# Patient Record
Sex: FEMALE | Race: BLACK OR AFRICAN AMERICAN | NOT HISPANIC OR LATINO | Employment: FULL TIME | URBAN - METROPOLITAN AREA
[De-identification: names, ages, dates, MRNs, and addresses within clinical notes are randomized per-mention and may not be internally consistent; named-entity substitution may affect disease eponyms.]

---

## 2017-03-31 ENCOUNTER — PATIENT MESSAGE (OUTPATIENT)
Dept: FAMILY MEDICINE | Facility: CLINIC | Age: 37
End: 2017-03-31

## 2017-04-11 ENCOUNTER — LAB VISIT (OUTPATIENT)
Dept: LAB | Facility: HOSPITAL | Age: 37
End: 2017-04-11
Attending: FAMILY MEDICINE
Payer: COMMERCIAL

## 2017-04-11 ENCOUNTER — OFFICE VISIT (OUTPATIENT)
Dept: FAMILY MEDICINE | Facility: CLINIC | Age: 37
End: 2017-04-11
Payer: COMMERCIAL

## 2017-04-11 VITALS
HEART RATE: 56 BPM | TEMPERATURE: 98 F | BODY MASS INDEX: 23.38 KG/M2 | SYSTOLIC BLOOD PRESSURE: 106 MMHG | WEIGHT: 140.31 LBS | HEIGHT: 65 IN | DIASTOLIC BLOOD PRESSURE: 70 MMHG

## 2017-04-11 DIAGNOSIS — E04.1 THYROID NODULE: ICD-10-CM

## 2017-04-11 DIAGNOSIS — G43.909 MIGRAINE WITHOUT STATUS MIGRAINOSUS, NOT INTRACTABLE, UNSPECIFIED MIGRAINE TYPE: ICD-10-CM

## 2017-04-11 DIAGNOSIS — Z00.00 ROUTINE PHYSICAL EXAMINATION: Primary | ICD-10-CM

## 2017-04-11 LAB
ALBUMIN SERPL BCP-MCNC: 4.2 G/DL
ALP SERPL-CCNC: 42 U/L
ALT SERPL W/O P-5'-P-CCNC: 9 U/L
ANION GAP SERPL CALC-SCNC: 10 MMOL/L
AST SERPL-CCNC: 16 U/L
BASOPHILS # BLD AUTO: 0.03 K/UL
BASOPHILS NFR BLD: 0.5 %
BILIRUB SERPL-MCNC: 0.8 MG/DL
BUN SERPL-MCNC: 10 MG/DL
CALCIUM SERPL-MCNC: 9.4 MG/DL
CHLORIDE SERPL-SCNC: 107 MMOL/L
CHOLEST/HDLC SERPL: 3.5 {RATIO}
CO2 SERPL-SCNC: 22 MMOL/L
CREAT SERPL-MCNC: 0.9 MG/DL
DIFFERENTIAL METHOD: ABNORMAL
EOSINOPHIL # BLD AUTO: 0.5 K/UL
EOSINOPHIL NFR BLD: 6.8 %
ERYTHROCYTE [DISTWIDTH] IN BLOOD BY AUTOMATED COUNT: 12.9 %
EST. GFR  (AFRICAN AMERICAN): >60 ML/MIN/1.73 M^2
EST. GFR  (NON AFRICAN AMERICAN): >60 ML/MIN/1.73 M^2
GLUCOSE SERPL-MCNC: 87 MG/DL
HCT VFR BLD AUTO: 36.6 %
HDL/CHOLESTEROL RATIO: 28.8 %
HDLC SERPL-MCNC: 198 MG/DL
HDLC SERPL-MCNC: 57 MG/DL
HGB BLD-MCNC: 12.6 G/DL
LDLC SERPL CALC-MCNC: 125.2 MG/DL
LYMPHOCYTES # BLD AUTO: 2.4 K/UL
LYMPHOCYTES NFR BLD: 36.9 %
MCH RBC QN AUTO: 30.9 PG
MCHC RBC AUTO-ENTMCNC: 34.4 %
MCV RBC AUTO: 90 FL
MONOCYTES # BLD AUTO: 0.4 K/UL
MONOCYTES NFR BLD: 6.3 %
NEUTROPHILS # BLD AUTO: 3.3 K/UL
NEUTROPHILS NFR BLD: 49.5 %
NONHDLC SERPL-MCNC: 141 MG/DL
PLATELET # BLD AUTO: 277 K/UL
PMV BLD AUTO: 11.1 FL
POTASSIUM SERPL-SCNC: 3.5 MMOL/L
PROT SERPL-MCNC: 7.5 G/DL
RBC # BLD AUTO: 4.08 M/UL
SODIUM SERPL-SCNC: 139 MMOL/L
T4 FREE SERPL-MCNC: 1.2 NG/DL
TRIGL SERPL-MCNC: 79 MG/DL
TSH SERPL DL<=0.005 MIU/L-ACNC: 0.82 UIU/ML
WBC # BLD AUTO: 6.62 K/UL

## 2017-04-11 PROCEDURE — 84439 ASSAY OF FREE THYROXINE: CPT

## 2017-04-11 PROCEDURE — 80053 COMPREHEN METABOLIC PANEL: CPT

## 2017-04-11 PROCEDURE — 85025 COMPLETE CBC W/AUTO DIFF WBC: CPT

## 2017-04-11 PROCEDURE — 80061 LIPID PANEL: CPT

## 2017-04-11 PROCEDURE — 83036 HEMOGLOBIN GLYCOSYLATED A1C: CPT

## 2017-04-11 PROCEDURE — 99999 PR PBB SHADOW E&M-EST. PATIENT-LVL III: CPT | Mod: PBBFAC,,, | Performed by: FAMILY MEDICINE

## 2017-04-11 PROCEDURE — 36415 COLL VENOUS BLD VENIPUNCTURE: CPT | Mod: PO

## 2017-04-11 PROCEDURE — 84443 ASSAY THYROID STIM HORMONE: CPT

## 2017-04-11 PROCEDURE — 99395 PREV VISIT EST AGE 18-39: CPT | Mod: S$GLB,,, | Performed by: FAMILY MEDICINE

## 2017-04-11 RX ORDER — PROPRANOLOL HYDROCHLORIDE 80 MG/1
80 TABLET ORAL DAILY
Qty: 30 TABLET | Refills: 1 | Status: SHIPPED | OUTPATIENT
Start: 2017-04-11 | End: 2017-06-25 | Stop reason: SDUPTHER

## 2017-04-11 RX ORDER — NAPROXEN 500 MG/1
500 TABLET ORAL 2 TIMES DAILY PRN
Qty: 30 TABLET | Refills: 0 | Status: SHIPPED | OUTPATIENT
Start: 2017-04-11 | End: 2017-06-28 | Stop reason: SDUPTHER

## 2017-04-11 NOTE — PROGRESS NOTES
[facility]  Progress Note    SUBJECTIVE:     Chief Complaint   Patient presents with    Headache       HPI   Linda Rivera  is a 36 y.o. female here for annual exam. She also has c/o migraines which are becoming more frequently. They have a 2 year old babygirl who sleep with them in the bed. She admits getting maybe 5-6 hours of interrupted sleep. When the migraine comes, she has photo/phonophobia. imitrex used to work but isn't working as well.    Review of patient's allergies indicates:   Allergen Reactions    No known allergies        Past Medical History:   Diagnosis Date    Migraine headache      Past Surgical History:   Procedure Laterality Date    TUBAL LIGATION  2014    [facility]     Family History   Problem Relation Age of Onset    Hypertension Mother     Cancer Father     Diabetes Sister     Heart disease Sister      Social History   Substance Use Topics    Smoking status: Never Smoker    Smokeless tobacco: Never Used    Alcohol use No      Comment: social        Review of Systems   Constitutional: Negative for chills, diaphoresis, fever and malaise/fatigue.   HENT: Negative for congestion, ear pain, hearing loss and sore throat.    Eyes: Positive for photophobia (and phonophobia). Negative for blurred vision, double vision, discharge and redness.   Respiratory: Negative.  Negative for cough, shortness of breath and wheezing.    Cardiovascular: Negative.  Negative for chest pain and palpitations.   Gastrointestinal: Negative.  Negative for abdominal pain, constipation, diarrhea, nausea and vomiting.   Genitourinary: Negative.  Negative for dysuria and hematuria.   Musculoskeletal: Negative for back pain and myalgias.   Skin: Negative for itching and rash.   Neurological: Positive for headaches. Negative for dizziness, sensory change, focal weakness and weakness.   All other systems reviewed and are negative.    OBJECTIVE:     Vitals:    04/11/17 1548   BP: 106/70   Pulse: (!) 56   Temp:  98.3 °F (36.8 °C)     Body mass index is 23.35 kg/(m^2).    Physical Exam   Constitutional: She is oriented to person, place, and time and well-developed, well-nourished, and in no distress. No distress.   HENT:   Head: Normocephalic and atraumatic.   Right Ear: Tympanic membrane is not perforated, not erythematous and not bulging. No hemotympanum.   Left Ear: Tympanic membrane is not perforated, not erythematous and not bulging. No hemotympanum.   Mouth/Throat: Oropharynx is clear and moist. No oropharyngeal exudate.   Eyes: Conjunctivae and EOM are normal. Pupils are equal, round, and reactive to light.   Neck: No thyromegaly present.   Cardiovascular: Normal rate, regular rhythm and normal heart sounds.  Exam reveals no gallop and no friction rub.    No murmur heard.  Pulmonary/Chest: Effort normal and breath sounds normal. No respiratory distress. She has no wheezes. She has no rales.   Abdominal: Soft. Bowel sounds are normal. She exhibits no distension. There is no tenderness. There is no rebound and no guarding.   Musculoskeletal: Normal range of motion. She exhibits no edema or tenderness.   Lymphadenopathy:     She has no cervical adenopathy.   Neurological: She is alert and oriented to person, place, and time.   CN 2-12 grossly intact   Skin: Skin is warm. No rash noted. She is not diaphoretic. No erythema.       Old records were reviewed. Labs and/or images were independently reviewed.    ASSESSMENT     1. Routine physical examination    2. Migraine without status migrainosus, not intractable, unspecified migraine type    3. Thyroid nodule        PLAN:     Routine physical examination   -     We briefly discussed diet, exercise, and routine preventive exams. All questions and comments addressed.    Migraine without status migrainosus, not intractable, unspecified migraine type  -     naproxen (NAPROSYN) 500 MG tablet; Take 1 tablet (500 mg total) by mouth 2 (two) times daily as needed.  Dispense: 30  tablet; Refill: 0  -     propranolol (INDERAL) 80 MG tablet; Take 1 tablet (80 mg total) by mouth once daily.  Dispense: 30 tablet; Refill: 1  -     CBC auto differential; Future  -     Comprehensive metabolic panel; Future  -     Hemoglobin A1c; Future  -     Lipid panel; Future  -     TSH; Future  -     T4, free; Future  -      Take naproxen prn migraines. Will start propranolol for migraine prophylaxis. Will titrate up as needed.  -      Advised that part of this is her sleep deprivation. She needs to get at least 8 hours a night. Recommend moving baby girl to her own bed.    Thyroid nodule  -     US Soft Tissue Head Neck Thyroid; Future; Expected date: 4/11/17  -      Obtain thyroid u/s to ensure stability.      RTC PRN1 month for migraine management.    Peter Delaney MD  04/11/2017 4:24 PM

## 2017-04-12 LAB
ESTIMATED AVG GLUCOSE: 108 MG/DL
HBA1C MFR BLD HPLC: 5.4 %

## 2017-06-25 DIAGNOSIS — G43.909 MIGRAINE WITHOUT STATUS MIGRAINOSUS, NOT INTRACTABLE, UNSPECIFIED MIGRAINE TYPE: ICD-10-CM

## 2017-06-26 RX ORDER — PROPRANOLOL HYDROCHLORIDE 80 MG/1
TABLET ORAL
Qty: 30 TABLET | Refills: 5 | Status: SHIPPED | OUTPATIENT
Start: 2017-06-26 | End: 2019-06-21

## 2017-06-28 DIAGNOSIS — G43.909 MIGRAINE WITHOUT STATUS MIGRAINOSUS, NOT INTRACTABLE, UNSPECIFIED MIGRAINE TYPE: ICD-10-CM

## 2017-06-28 RX ORDER — NAPROXEN 500 MG/1
TABLET ORAL
Qty: 30 TABLET | Refills: 0 | Status: SHIPPED | OUTPATIENT
Start: 2017-06-28 | End: 2017-08-25 | Stop reason: SDUPTHER

## 2017-08-25 DIAGNOSIS — G43.909 MIGRAINE WITHOUT STATUS MIGRAINOSUS, NOT INTRACTABLE, UNSPECIFIED MIGRAINE TYPE: ICD-10-CM

## 2017-08-25 RX ORDER — NAPROXEN 500 MG/1
TABLET ORAL
Qty: 30 TABLET | Refills: 0 | Status: SHIPPED | OUTPATIENT
Start: 2017-08-25 | End: 2018-05-08

## 2017-09-29 ENCOUNTER — OFFICE VISIT (OUTPATIENT)
Dept: FAMILY MEDICINE | Facility: CLINIC | Age: 37
End: 2017-09-29
Payer: COMMERCIAL

## 2017-09-29 VITALS
TEMPERATURE: 98 F | HEART RATE: 66 BPM | WEIGHT: 148.38 LBS | DIASTOLIC BLOOD PRESSURE: 68 MMHG | HEIGHT: 65 IN | OXYGEN SATURATION: 97 % | BODY MASS INDEX: 24.72 KG/M2 | SYSTOLIC BLOOD PRESSURE: 102 MMHG

## 2017-09-29 DIAGNOSIS — G43.009 MIGRAINE WITHOUT AURA AND WITHOUT STATUS MIGRAINOSUS, NOT INTRACTABLE: ICD-10-CM

## 2017-09-29 PROCEDURE — 3008F BODY MASS INDEX DOCD: CPT | Mod: S$GLB,,, | Performed by: FAMILY MEDICINE

## 2017-09-29 PROCEDURE — 99999 PR PBB SHADOW E&M-EST. PATIENT-LVL III: CPT | Mod: PBBFAC,,, | Performed by: FAMILY MEDICINE

## 2017-09-29 PROCEDURE — 99214 OFFICE O/P EST MOD 30 MIN: CPT | Mod: S$GLB,,, | Performed by: FAMILY MEDICINE

## 2017-09-29 RX ORDER — SUMATRIPTAN SUCCINATE 100 MG/1
TABLET ORAL
Qty: 9 TABLET | Refills: 2 | Status: CANCELLED | OUTPATIENT
Start: 2017-09-29

## 2017-09-29 RX ORDER — RIZATRIPTAN BENZOATE 5 MG/1
5 TABLET ORAL DAILY PRN
Qty: 15 TABLET | Refills: 1 | Status: SHIPPED | OUTPATIENT
Start: 2017-09-29 | End: 2017-12-11 | Stop reason: SDUPTHER

## 2017-09-29 NOTE — PROGRESS NOTES
[facility]  Progress Note    SUBJECTIVE:     Chief Complaint   Patient presents with    Migraine       HPI   Linda Rivera  is a 36 y.o. female here for follow up of her migraines. She says the propranolol is helping decreasing her frequency of the headaches. The naproxen doesn't help at all. When headaches do come, may last 2 days.     Review of patient's allergies indicates:   Allergen Reactions    No known allergies        Past Medical History:   Diagnosis Date    Migraine headache      Past Surgical History:   Procedure Laterality Date    TUBAL LIGATION  2014    [facility]     Family History   Problem Relation Age of Onset    Hypertension Mother     Cancer Father     Diabetes Sister     Heart disease Sister      Social History   Substance Use Topics    Smoking status: Never Smoker    Smokeless tobacco: Never Used    Alcohol use No      Comment: social        Review of Systems   HENT: Negative for hearing loss.    Eyes: Negative for discharge.   Respiratory: Negative for wheezing.    Cardiovascular: Negative for chest pain and palpitations.   Gastrointestinal: Negative for blood in stool, constipation, diarrhea and vomiting.   Genitourinary: Negative for dysuria and hematuria.   Musculoskeletal: Negative for neck pain.   Neurological: Positive for headaches. Negative for weakness.   Endo/Heme/Allergies: Negative for polydipsia.     OBJECTIVE:     Vitals:    09/29/17 1023   BP: 102/68   Pulse: 66   Temp: 98.2 °F (36.8 °C)     Body mass index is 24.69 kg/m².    Physical Exam   Constitutional: She is oriented to person, place, and time and well-developed, well-nourished, and in no distress. No distress.   HENT:   Head: Normocephalic and atraumatic.   Nose: Nose normal.   Eyes: Conjunctivae and EOM are normal.   Cardiovascular: Normal rate, regular rhythm and normal heart sounds.  Exam reveals no gallop and no friction rub.    No murmur heard.  Pulmonary/Chest: Effort normal and breath sounds normal.  No respiratory distress. She has no wheezes. She has no rales. She exhibits no tenderness.   Abdominal: Soft. Bowel sounds are normal. She exhibits no distension. There is no tenderness. There is no rebound.   Neurological: She is alert and oriented to person, place, and time.   No neurological deficits   Skin: Skin is warm. She is not diaphoretic.       Old records were reviewed. Labs and/or images were independently reviewed.    ASSESSMENT     1. Migraine without aura and without status migrainosus, not intractable        PLAN:     Migraine without aura and without status migrainosus, not intractable  -     Start rizatriptan (MAXALT) 5 MG tablet; Take 1 tablet (5 mg total) by mouth daily as needed for Migraine.  Dispense: 15 tablet; Refill: 1  -     Propranolol helping to decrease frequency. Continue current regimen. Will start rizatriptan to help break migraines if they do come, which she say maybe once a month now.    Other orders  -     Cancel: sumatriptan (IMITREX) 100 MG tablet; TAKE 1 TABLET BY MOUTH EVERY DAY AS NEEDED FOR MIGRAINE. MAY TAKE AN ADDITIONAL TABLET 2 HOURS AFTER FIRST TABLET IF NO RELIEF  Dispense: 9 tablet; Refill: 2      RTC GABRIELLA Delaney MD  09/29/2017 11:09 AM

## 2017-12-11 ENCOUNTER — PATIENT MESSAGE (OUTPATIENT)
Dept: FAMILY MEDICINE | Facility: CLINIC | Age: 37
End: 2017-12-11

## 2017-12-11 DIAGNOSIS — G43.009 MIGRAINE WITHOUT AURA AND WITHOUT STATUS MIGRAINOSUS, NOT INTRACTABLE: ICD-10-CM

## 2017-12-11 RX ORDER — RIZATRIPTAN BENZOATE 5 MG/1
5 TABLET ORAL DAILY PRN
Qty: 15 TABLET | Refills: 0 | Status: SHIPPED | OUTPATIENT
Start: 2017-12-11 | End: 2018-07-06 | Stop reason: SDUPTHER

## 2017-12-28 ENCOUNTER — HOSPITAL ENCOUNTER (EMERGENCY)
Facility: OTHER | Age: 37
Discharge: HOME | End: 2017-12-28
Attending: EMERGENCY MEDICINE
Payer: COMMERCIAL

## 2017-12-28 VITALS
DIASTOLIC BLOOD PRESSURE: 76 MMHG | BODY MASS INDEX: 26.66 KG/M2 | TEMPERATURE: 100 F | OXYGEN SATURATION: 100 % | RESPIRATION RATE: 18 BRPM | SYSTOLIC BLOOD PRESSURE: 122 MMHG | WEIGHT: 160 LBS | HEART RATE: 86 BPM | HEIGHT: 65 IN

## 2017-12-28 DIAGNOSIS — J10.1 INFLUENZA A: Primary | ICD-10-CM

## 2017-12-28 LAB
CTP QC/QA: YES
FLUAV AG NPH QL: POSITIVE
FLUBV AG NPH QL: NEGATIVE

## 2017-12-28 PROCEDURE — 25000003 PHARM REV CODE 250: Performed by: EMERGENCY MEDICINE

## 2017-12-28 PROCEDURE — 99284 EMERGENCY DEPT VISIT MOD MDM: CPT

## 2017-12-28 PROCEDURE — 87804 INFLUENZA ASSAY W/OPTIC: CPT

## 2017-12-28 RX ORDER — IBUPROFEN 800 MG/1
800 TABLET ORAL EVERY 6 HOURS PRN
Qty: 20 TABLET | Refills: 0 | Status: SHIPPED | OUTPATIENT
Start: 2017-12-28 | End: 2018-05-08

## 2017-12-28 RX ORDER — IBUPROFEN 400 MG/1
800 TABLET ORAL
Status: COMPLETED | OUTPATIENT
Start: 2017-12-28 | End: 2017-12-28

## 2017-12-28 RX ORDER — CODEINE PHOSPHATE AND GUAIFENESIN 10; 100 MG/5ML; MG/5ML
10 SOLUTION ORAL EVERY 4 HOURS PRN
Qty: 236 ML | Refills: 0 | Status: SHIPPED | OUTPATIENT
Start: 2017-12-28 | End: 2018-01-07

## 2017-12-28 RX ORDER — OSELTAMIVIR PHOSPHATE 75 MG/1
75 CAPSULE ORAL 2 TIMES DAILY
Qty: 10 CAPSULE | Refills: 0 | Status: SHIPPED | OUTPATIENT
Start: 2017-12-28 | End: 2018-01-02

## 2017-12-28 RX ADMIN — IBUPROFEN 800 MG: 400 TABLET, FILM COATED ORAL at 03:12

## 2017-12-28 NOTE — ED PROVIDER NOTES
"Encounter Date: 12/28/2017       History     Chief Complaint   Patient presents with    Influenza     Pt states, " Body aches chills and cough for three days."     Chief complaint: Headache  37-year-old complains of pressure over her face.  Patient symptoms began 2 days ago.  She has a history of migraines and says this feels different.  She denies fever but has had chills.  She had a sore throat 2 days ago that has resolved.  She does report nasal congestion.  She's had a decreased appetite.  She reports generalized body aches.  Patient has been taken over-the-counter medicines with some improvement.      The history is provided by the patient.     Review of patient's allergies indicates:   Allergen Reactions    No known allergies      Past Medical History:   Diagnosis Date    Migraine headache      Past Surgical History:   Procedure Laterality Date    TUBAL LIGATION  2014    [facility]     Family History   Problem Relation Age of Onset    Hypertension Mother     Cancer Father     Diabetes Sister     Heart disease Sister      Social History   Substance Use Topics    Smoking status: Never Smoker    Smokeless tobacco: Never Used    Alcohol use No      Comment: social     Review of Systems   Constitutional: Positive for activity change, appetite change and chills. Negative for fever.   HENT: Positive for congestion. Negative for sore throat.    Eyes: Negative for photophobia.   Respiratory: Positive for cough. Negative for shortness of breath.    Cardiovascular: Negative for chest pain.   Gastrointestinal: Positive for nausea.   Genitourinary: Negative for dysuria.   Musculoskeletal: Positive for myalgias. Negative for back pain.   Skin: Negative for rash.   Neurological: Positive for headaches. Negative for weakness.   Hematological: Does not bruise/bleed easily.       Physical Exam     Initial Vitals [12/28/17 1401]   BP Pulse Resp Temp SpO2   121/82 85 16 (!) 100.9 °F (38.3 °C) 99 %      MAP       95     "     Physical Exam    Nursing note and vitals reviewed.  Constitutional: She appears well-developed and well-nourished.   HENT:   Head: Normocephalic and atraumatic.   Eyes: Conjunctivae and EOM are normal. Pupils are equal, round, and reactive to light.   Neck: Trachea normal, normal range of motion and full passive range of motion without pain. Neck supple. No neck rigidity.   Cardiovascular: Normal rate and regular rhythm.   Pulmonary/Chest: Breath sounds normal.   Abdominal: Soft. There is no tenderness. There is no rebound and no guarding.   Musculoskeletal: Normal range of motion.   Neurological: She is alert and oriented to person, place, and time. She has normal strength.   Skin: Skin is warm and dry.   Psychiatric: She has a normal mood and affect.         ED Course   Procedures  Labs Reviewed   POCT INFLUENZA A/B - Abnormal; Notable for the following:        Result Value    Rapid Influenza A Ag Positive (*)     All other components within normal limits             Medical Decision Making:   Initial Assessment:   37-year-old who complains of headache associated with flulike symptoms.  Patient symptoms have been ongoing for 2 days.  On exam patient is nontoxic appearing.  She has a fever to 100.9  She has no nuchal rigidity  ED Management:  Condition did test positive for influenza A.  She was given ibuprofen in the ER for a temperature of 100.9.  She'll be discharged on Tamiflu and guaifenesin with codeine.                   ED Course      Clinical Impression:   The encounter diagnosis was Influenza A.                           Lori Carvajal MD  12/28/17 2568

## 2018-05-08 ENCOUNTER — OFFICE VISIT (OUTPATIENT)
Dept: OBSTETRICS AND GYNECOLOGY | Facility: CLINIC | Age: 38
End: 2018-05-08
Payer: COMMERCIAL

## 2018-05-08 VITALS
BODY MASS INDEX: 26.45 KG/M2 | WEIGHT: 158.75 LBS | HEIGHT: 65 IN | DIASTOLIC BLOOD PRESSURE: 76 MMHG | SYSTOLIC BLOOD PRESSURE: 108 MMHG

## 2018-05-08 DIAGNOSIS — Z01.419 ENCOUNTER FOR GYNECOLOGICAL EXAMINATION WITHOUT ABNORMAL FINDING: Primary | ICD-10-CM

## 2018-05-08 PROCEDURE — 99999 PR PBB SHADOW E&M-EST. PATIENT-LVL II: CPT | Mod: PBBFAC,,, | Performed by: OBSTETRICS & GYNECOLOGY

## 2018-05-08 PROCEDURE — 99395 PREV VISIT EST AGE 18-39: CPT | Mod: S$GLB,,, | Performed by: OBSTETRICS & GYNECOLOGY

## 2018-05-08 PROCEDURE — 88175 CYTOPATH C/V AUTO FLUID REDO: CPT

## 2018-05-08 NOTE — PROGRESS NOTES
PT HERE FOR ANNUAL.    ROS:  GENERAL: No fever, chills, fatigability or weight loss.  VULVAR: No pain, no lesions and no itching.  VAGINAL: No relaxation, no itching, no discharge, no abnormal bleeding and no lesions.  ABDOMEN: No abdominal pain. Denies nausea. Denies vomiting. No diarrhea. No constipation  BREAST: Denies pain. No lumps. No discharge.  URINARY: No incontinence, no nocturia, no frequency and no dysuria.  CARDIOVASCULAR: No chest pain. No shortness of breath. No leg cramps.  NEUROLOGICAL: No headaches. No vision changes.  The remainder of the review of systems was negative.    PE:  General Appearance: overweight And Well developed. Well nourished. In no acute distress.  Vulva: Lesions: No.  Urethral Meatus: Normal size. Normal location. No lesions. No prolapse.  Urethra: No masses. No tenderness. No prolapse. No scarring.  Bladder: No masses. No tenderness.  Vagina: Mucosa NI:yes discharge no, atrophy no, cystocele no or rectocele no.  Cervix: Lesion: no  Stenotic: no Cervical motion tenderness: no  Uterus: Uterus size: 6 weeks. Support good. Uterus size: Normal  Adnexa: Masses: No Tenderness: No CDS Nodularity: No  Abdomen: overweight No masses. No tenderness.  Breasts: No bilateral masses. No bilateral discharge. No bilateral tenderness. No bilateral fibrocystic changes.  Neck: No thyroid enlargement. No thyroid masses.  Skin: Rashes: No      PROCEDURES:    PLAN:     DIAGNOSIS:  1. Encounter for gynecological examination without abnormal finding        MEDICATIONS & ORDERS:  Orders Placed This Encounter    Liquid-based pap smear, screening       Patient was counseled today on the new ACS guidelines for cervical cytology screening as well as the current recommendations for breast cancer screening. She was counseled to follow up with her PCP for other routine health maintenance. Counseling session lasted approximately 10 minutes, and all her questions were answered.         FOLLOW-UP: With me in 12  month

## 2018-07-06 DIAGNOSIS — G43.009 MIGRAINE WITHOUT AURA AND WITHOUT STATUS MIGRAINOSUS, NOT INTRACTABLE: ICD-10-CM

## 2018-07-06 RX ORDER — RIZATRIPTAN BENZOATE 5 MG/1
TABLET ORAL
Qty: 15 TABLET | Refills: 0 | Status: SHIPPED | OUTPATIENT
Start: 2018-07-06 | End: 2018-09-20 | Stop reason: SDUPTHER

## 2018-09-20 DIAGNOSIS — G43.009 MIGRAINE WITHOUT AURA AND WITHOUT STATUS MIGRAINOSUS, NOT INTRACTABLE: ICD-10-CM

## 2018-09-20 RX ORDER — RIZATRIPTAN BENZOATE 5 MG/1
TABLET ORAL
Qty: 15 TABLET | Refills: 0 | Status: SHIPPED | OUTPATIENT
Start: 2018-09-20 | End: 2019-11-11

## 2019-02-19 ENCOUNTER — OFFICE VISIT (OUTPATIENT)
Dept: PODIATRY | Facility: CLINIC | Age: 39
End: 2019-02-19
Payer: COMMERCIAL

## 2019-02-19 VITALS — WEIGHT: 158 LBS | BODY MASS INDEX: 26.33 KG/M2 | HEIGHT: 65 IN

## 2019-02-19 DIAGNOSIS — M21.41 PES PLANUS OF BOTH FEET: ICD-10-CM

## 2019-02-19 DIAGNOSIS — M79.671 FOOT PAIN, RIGHT: Primary | ICD-10-CM

## 2019-02-19 DIAGNOSIS — M21.42 PES PLANUS OF BOTH FEET: ICD-10-CM

## 2019-02-19 DIAGNOSIS — M20.41 HAMMER TOES OF BOTH FEET: ICD-10-CM

## 2019-02-19 DIAGNOSIS — M20.5X1 HALLUX LIMITUS, ACQUIRED, RIGHT: ICD-10-CM

## 2019-02-19 DIAGNOSIS — M20.5X2 HALLUX LIMITUS, ACQUIRED, LEFT: ICD-10-CM

## 2019-02-19 DIAGNOSIS — M20.42 HAMMER TOES OF BOTH FEET: ICD-10-CM

## 2019-02-19 PROCEDURE — 99999 PR PBB SHADOW E&M-EST. PATIENT-LVL III: ICD-10-PCS | Mod: PBBFAC,,, | Performed by: PODIATRIST

## 2019-02-19 PROCEDURE — 3008F BODY MASS INDEX DOCD: CPT | Mod: CPTII,S$GLB,, | Performed by: PODIATRIST

## 2019-02-19 PROCEDURE — 99999 PR PBB SHADOW E&M-EST. PATIENT-LVL III: CPT | Mod: PBBFAC,,, | Performed by: PODIATRIST

## 2019-02-19 PROCEDURE — 3008F PR BODY MASS INDEX (BMI) DOCUMENTED: ICD-10-PCS | Mod: CPTII,S$GLB,, | Performed by: PODIATRIST

## 2019-02-19 PROCEDURE — 99203 OFFICE O/P NEW LOW 30 MIN: CPT | Mod: S$GLB,,, | Performed by: PODIATRIST

## 2019-02-19 PROCEDURE — 99203 PR OFFICE/OUTPT VISIT, NEW, LEVL III, 30-44 MIN: ICD-10-PCS | Mod: S$GLB,,, | Performed by: PODIATRIST

## 2019-02-19 RX ORDER — MELOXICAM 15 MG/1
15 TABLET ORAL DAILY
Qty: 30 TABLET | Refills: 1 | Status: SHIPPED | OUTPATIENT
Start: 2019-02-19 | End: 2019-06-21

## 2019-02-20 NOTE — PROGRESS NOTES
Subjective:      Patient ID: Linda Rivera is a 38 y.o. female.    Chief Complaint: Foot Problem (right foot bunion pain Pcp Dr. Delaney ); Foot Pain; and Bunions    Linda RAMSEY is a 38 y.o. female who presents to the podiatry clinic  with complaint of  right 1st MTPJ pain. Onset of the symptoms was several months ago. Precipitating event: none known. Current symptoms include: ability to bear weight, but with some pain, stiffness and swelling. Aggravating factors: standing and walking. Symptoms have waxed and waned but are worse overall. Patient has had no prior foot problems. Evaluation to date: none. Treatment to date: none. Patients rates pain 9/10 on pain scale.    Current shoe gear:   casual shoes, she admits to wearing flip flops    Patient Active Problem List   Diagnosis    Allergic rhinitis       Current Outpatient Medications on File Prior to Visit   Medication Sig Dispense Refill    propranolol (INDERAL) 80 MG tablet TAKE 1 TABLET(80 MG) BY MOUTH EVERY DAY 30 tablet 5    rizatriptan (MAXALT) 5 MG tablet TAKE 1 TABLET(5 MG) BY MOUTH DAILY AS NEEDED FOR MIGRAINE 15 tablet 0    loratadine (CLARITIN) 10 mg tablet Take 1 tablet (10 mg total) by mouth daily as needed. 30 tablet 0     No current facility-administered medications on file prior to visit.        Review of patient's allergies indicates:   Allergen Reactions    No known allergies        Past Surgical History:   Procedure Laterality Date    LIGATION, FALLOPIAN TUBE, LAPAROSCOPIC Bilateral 5/6/2014    Performed by Oliverio Bazan Jr., MD at [facility]    TUBAL LIGATION  2014    [facility]       Family History   Problem Relation Age of Onset    Hypertension Mother     Cancer Father     Diabetes Sister     Heart disease Sister        Social History     Socioeconomic History    Marital status:      Spouse name: Not on file    Number of children: Not on file    Years of education: Not on file    Highest education level: Not on file   Social Needs  "   Financial resource strain: Not on file    Food insecurity - worry: Not on file    Food insecurity - inability: Not on file    Transportation needs - medical: Not on file    Transportation needs - non-medical: Not on file   Occupational History    Not on file   Tobacco Use    Smoking status: Never Smoker    Smokeless tobacco: Never Used   Substance and Sexual Activity    Alcohol use: No     Alcohol/week: 0.0 oz     Comment: social    Drug use: No    Sexual activity: Yes     Partners: Male     Birth control/protection: None   Other Topics Concern    Not on file   Social History Narrative    Not on file     Review of Systems   Constitution: Negative for chills, fever and weakness.   Cardiovascular: Negative for claudication and leg swelling.   Respiratory: Negative for cough and shortness of breath.    Skin: Negative for dry skin, itching, nail changes and rash.   Musculoskeletal: Positive for joint pain and myalgias. Negative for falls, joint swelling and muscle weakness.   Gastrointestinal: Negative for diarrhea, nausea and vomiting.   Neurological: Positive for paresthesias. Negative for numbness and tremors.   Psychiatric/Behavioral: Negative for altered mental status and hallucinations.           Objective:      Vitals:    02/19/19 1826   Weight: 71.7 kg (158 lb)   Height: 5' 5" (1.651 m)   PainSc:   9   PainLoc: Foot       Physical Exam   Constitutional:  Non-toxic appearance. She does not have a sickly appearance. No distress.   Cardiovascular:   Pulses:       Dorsalis pedis pulses are 2+ on the right side, and 2+ on the left side.        Posterior tibial pulses are 2+ on the right side, and 2+ on the left side.   Pulmonary/Chest: No respiratory distress.   Musculoskeletal:        Right ankle: She exhibits decreased range of motion. She exhibits no swelling. No tenderness. No lateral malleolus, no medial malleolus, no AITFL, no CF ligament and no posterior TFL tenderness found. Achilles tendon " exhibits no pain, no defect and normal Bunn's test results.        Left ankle: She exhibits decreased range of motion. She exhibits no swelling. No tenderness. No lateral malleolus, no medial malleolus, no AITFL, no CF ligament and no posterior TFL tenderness found. Achilles tendon exhibits no pain, no defect and normal Bunn's test results.        Right foot: There is tenderness (dorsal 1st MTPJ and with end ROM) and swelling. There is no bony tenderness.        Left foot: There is no bony tenderness.   There is equinus deformity bilateral with decreased dorsiflexion at the ankle joint bilateral.  Gait analysis reveals excessive pronation through midstance and propulsion with early heel off. Shoes reveals lateral heel counter wear bilateral     Decreased first MPJ range of motion both weightbearing and nonweightbearing, no crepitus observed the first MP joint, + dorsal flag sign. Mild  bunion deformity is observed .    Patient has hammertoes of digits 2-5 bilateral partially reducible      Neurological: She has normal reflexes. She displays no atrophy and no tremor. No sensory deficit. She exhibits normal muscle tone.   Skin: Skin is warm, dry and intact. No bruising, no burn, no laceration, no lesion and no rash noted. She is not diaphoretic. No cyanosis. No pallor. Nails show no clubbing.   Psychiatric: Her mood appears not anxious. Her affect is not inappropriate. Her speech is not slurred. She is not combative. She is communicative. She is attentive.   Nursing note reviewed.        Assessment:       Encounter Diagnoses   Name Primary?    Foot pain, right Yes    Hallux limitus, acquired, right     Hallux limitus, acquired, left     Pes planus of both feet     Hammer toes of both feet          Plan:     Problem List Items Addressed This Visit     None      Visit Diagnoses     Foot pain, right    -  Primary    Relevant Orders    X-Ray Foot Complete Right    Hallux limitus, acquired, right         Relevant Orders    X-Ray Foot Complete Right    Hallux limitus, acquired, left        Pes planus of both feet        Relevant Orders    X-Ray Foot Complete Right    Hammer toes of both feet        Relevant Orders    X-Ray Foot Complete Right           Orders Placed This Encounter    X-Ray Foot Complete Right    meloxicam (MOBIC) 15 MG tablet       I counseled the patient on her conditions, their implications and medical management.      I did  the patient in detail regarding surgical and conservative treatment measures for hallux limitus. I informed the  patient that the majority of pain is secondary to an arthritic joint with decreased joint spaces. Informed patient that outside of surgical intervention the main goal of therapy is to decreased the  range of motion at the first MPJ joint. This can be done so by utilizing either and extremely hard soled nonflexible shoe or forefoot rocker    I gave written and verbal instructions on stretching exercises. Patient expressed understanding. Discussed icing the affected area as needed and also wearing appropriate shoe gear and avoiding flats, slippers, sandals, and going barefoot. My recommendation for OTC supports is [employer]. Patient instructed on adequate icing techniques. Patient should ice the affected area at least once per day x 10 minutes for 10 days I advised the patient that extra icing would also be beneficial to ensure adequate anti inflammatory effect. We also discussed cortisone injections and NSAID therapy. Mobic prescribed. Patient was instructed on dosing information. Discontinue if adverse effects occur     RTC in 6-9 weeks if no improvement, at this time patient will receive cortisone injections and referral to PT. Patient is amenable to plan.

## 2019-02-20 NOTE — PATIENT INSTRUCTIONS
Recommend lotions: eucerin, eucerin for diabetics, aquaphor, A&D ointment, gold bond for diabetics, sween, Maljamar's Bees all purpose baby ointment,  urea 40 with aloe (found on amazon.com)    Shoe recommendations: (try 6pm.com, zappos.com , nordstromrack[employer], or shoes[employer] for discounted prices) you can visit [employer] shoes in [place]  or [employer] in the [facility] (there are also several shoe brand outlets in the [facility])    Asics (GT 2000 or gel foundations), new balance stability type shoes (such as the 940 series), saucony (stabil c3),  Noe (GTS or Beast or transcend), propet (tennis shoe)    Sofft Brand (women) Stefania&Rajesh (men), clarks, crocs, aerosoles, naturalizers, SAS, ecco, born, cachorro chris, rockports (dress shoes)    Vionic, burkenstocks, fitflops, propet (sandals)    Nike comfort thong sandals, crocs, propet (house shoes)    Nail Home remedy:  Vicks Vapor rub to nails for easier manageability        What Is Arthritis in the Foot?  Degenerative arthritis is a condition that slowly wears away joints, the area where bones meet and move. In the beginning, you may notice that the affected joint seems stiff. It may even ache. As the joint lining (cartilage) breaks down, the bones rub against each other, causing pain and swelling. Over time, small pieces of rough or splintered bone (bone spurs) develop, and the joints range of motion becomes limited. But movement doesnt have to cause pain. The effects of arthritis can be reduced.    The big-toe joint  When arthritis affects your big toe, your foot hurts when it pushes off the ground. Arthritis often appears in the big-toe joint along with a bunion (a bony bump at the side of the joint) or a bone spur on top of the joint.    Other joints  When arthritis affects the rear or midfoot joints, you feel pain when you put weight on your foot. Arthritis may affect the joint where the ankle and foot meet. It may also affect other joints nearby.  Date Last  Reviewed: 7/1/2016  © 6786-8349 [employer] [address] [place] [address], [place] [address]. All rights reserved. This information is not intended as a substitute for professional medical care. Always follow your healthcare professional's instructions.        Treating Arthritis in the Foot  If your symptoms are mild, medications may be enough to reduce pain and swelling. For more severe arthritis, surgery may be needed to improve the condition of the joint.    Medicine  Your doctor may prescribe medicine--pills or injections--to limit pain and swelling. Ice, aspirin, acetaminophen, or ibuprofen may help relieve mild symptoms that occur after activity.  Surgery and bone trimming  To ease movement and reduce pain, your doctor may trim damaged bone. If arthritis is severe, the joint may be fused or removed. If the bone is not damaged too badly, your doctor may simply shave away bone spurs. Any excess bone growth related to a bunion may also be trimmed.  Fusing joints  If damage is more severe, your doctor may fuse the joint to prevent the bones from rubbing. Afterward, staples, plates, or screws may hold the bones in place so they heal properly. In some cases, the joint may be removed and replaced with an implant.  After surgery  During the early stages of recovery, your foot is likely to be bandaged and immobilized for a while. For best results, follow up with your doctor as scheduled. These visits help ensure that your foot heals properly.  As you heal  After surgery, youll be told how to care for your incision and how soon to begin walking on the foot. Until the foot can bear weight, you may need to walk with crutches or a cane.  For surgery on the big toe, your foot may be splinted to limit movement for several weeks. Despite this, you should be able to walk soon after surgery.  For surgery on rear or midfoot joints, you may need to wear a cast or surgical shoe. These joints are fairly large, so full  recovery may take a few months. Once the bone has healed, any staples, plates, or screws may be removed.  Date Last Reviewed: 7/1/2016 © 2000-2017 [employer]. [address] [place] [address], [place] [address]. All rights reserved. This information is not intended as a substitute for professional medical care. Always follow your healthcare professional's instructions.        Foot Surgery: Degenerative Joint Disease    Degenerative joint disease (arthritis) often happens in the joint of a big toe. This bone growth may cause pain and stiffness in the joint. Left untreated, arthritis can break down the cartilage and destroy the joint. Your treatment choices depend on how damaged your joint is. There are many nonsurgical treatments, but if these are not helpful, surgery may be considered.    Cheilectomy  This is done when the arthritic joint and cartilage can be saved. A bone spur caused by arthritis may be symptomatic on the top of the big toe joint. The procedure involves removing this bone spur, usually with a small part of the top of the joint itself.  You will need to wear a surgical shoe for several weeks. Once the foot heals, joint movement is restored.    Fusion  In fusion, the cartilage and some bone on both sides of the joint are removed. Then, the big toe and metatarsal bones are held together with staples, screws, or a plate and screws. Your foot may be placed in a cast. While you heal, you will be asked not to bear weight on this foot. You may also need crutches for several weeks. Because the joint has been removed, your toe will be less flexible.    Arthroplasty  During surgery, bone growth caused by the arthritis is trimmed, and part of the joint is removed. A pin can be used to align the bones and to keep them from touching. The pin is removed after several weeks. In some cases, the entire joint may be replaced with an implant. You may have to wear a splint or a surgical shoe for several weeks.  When healed, the bones become connected with scar tissue.  Date Last Reviewed: 10/15/2015  © 0628-4109 [employer], [employer]. [address] [place] [address], [place] [address]. All rights reserved. This information is not intended as a substitute for professional medical care. Always follow your healthcare professional's instructions.

## 2019-06-21 ENCOUNTER — LAB VISIT (OUTPATIENT)
Dept: LAB | Facility: HOSPITAL | Age: 39
End: 2019-06-21
Attending: INTERNAL MEDICINE
Payer: COMMERCIAL

## 2019-06-21 ENCOUNTER — OFFICE VISIT (OUTPATIENT)
Dept: ENDOCRINOLOGY | Facility: CLINIC | Age: 39
End: 2019-06-21
Payer: COMMERCIAL

## 2019-06-21 VITALS
DIASTOLIC BLOOD PRESSURE: 82 MMHG | BODY MASS INDEX: 24.91 KG/M2 | HEIGHT: 65 IN | HEART RATE: 60 BPM | RESPIRATION RATE: 16 BRPM | SYSTOLIC BLOOD PRESSURE: 116 MMHG | WEIGHT: 149.5 LBS | TEMPERATURE: 98 F

## 2019-06-21 DIAGNOSIS — E55.9 HYPOVITAMINOSIS D: Primary | ICD-10-CM

## 2019-06-21 DIAGNOSIS — E04.1 NODULAR THYROID DISEASE: ICD-10-CM

## 2019-06-21 DIAGNOSIS — E06.9 THYROIDITIS: ICD-10-CM

## 2019-06-21 DIAGNOSIS — E07.9 THYROID DISEASE: ICD-10-CM

## 2019-06-21 DIAGNOSIS — E04.9 GOITER: ICD-10-CM

## 2019-06-21 DIAGNOSIS — E55.9 HYPOVITAMINOSIS D: ICD-10-CM

## 2019-06-21 DIAGNOSIS — R79.89 ABNORMAL THYROID BLOOD TEST: ICD-10-CM

## 2019-06-21 LAB
BASOPHILS # BLD AUTO: 0.03 K/UL (ref 0–0.2)
BASOPHILS NFR BLD: 0.5 % (ref 0–1.9)
DIFFERENTIAL METHOD: ABNORMAL
EOSINOPHIL # BLD AUTO: 0.5 K/UL (ref 0–0.5)
EOSINOPHIL NFR BLD: 8.4 % (ref 0–8)
ERYTHROCYTE [DISTWIDTH] IN BLOOD BY AUTOMATED COUNT: 13.5 % (ref 11.5–14.5)
HCT VFR BLD AUTO: 36.2 % (ref 37–48.5)
HGB BLD-MCNC: 11.5 G/DL (ref 12–16)
IMM GRANULOCYTES # BLD AUTO: 0 K/UL (ref 0–0.04)
IMM GRANULOCYTES NFR BLD AUTO: 0 % (ref 0–0.5)
LYMPHOCYTES # BLD AUTO: 1.8 K/UL (ref 1–4.8)
LYMPHOCYTES NFR BLD: 32.9 % (ref 18–48)
MCH RBC QN AUTO: 30.2 PG (ref 27–31)
MCHC RBC AUTO-ENTMCNC: 31.8 G/DL (ref 32–36)
MCV RBC AUTO: 95 FL (ref 82–98)
MONOCYTES # BLD AUTO: 0.5 K/UL (ref 0.3–1)
MONOCYTES NFR BLD: 9.3 % (ref 4–15)
NEUTROPHILS # BLD AUTO: 2.7 K/UL (ref 1.8–7.7)
NEUTROPHILS NFR BLD: 48.9 % (ref 38–73)
NRBC BLD-RTO: 0 /100 WBC
PLATELET # BLD AUTO: 257 K/UL (ref 150–350)
PMV BLD AUTO: 11.2 FL (ref 9.2–12.9)
RBC # BLD AUTO: 3.81 M/UL (ref 4–5.4)
WBC # BLD AUTO: 5.6 K/UL (ref 3.9–12.7)

## 2019-06-21 PROCEDURE — 36415 COLL VENOUS BLD VENIPUNCTURE: CPT | Mod: PO

## 2019-06-21 PROCEDURE — 80061 LIPID PANEL: CPT

## 2019-06-21 PROCEDURE — 82308 ASSAY OF CALCITONIN: CPT

## 2019-06-21 PROCEDURE — 84432 ASSAY OF THYROGLOBULIN: CPT

## 2019-06-21 PROCEDURE — 82330 ASSAY OF CALCIUM: CPT

## 2019-06-21 PROCEDURE — 99204 OFFICE O/P NEW MOD 45 MIN: CPT | Mod: S$GLB,,, | Performed by: INTERNAL MEDICINE

## 2019-06-21 PROCEDURE — 80053 COMPREHEN METABOLIC PANEL: CPT

## 2019-06-21 PROCEDURE — 3008F BODY MASS INDEX DOCD: CPT | Mod: CPTII,S$GLB,, | Performed by: INTERNAL MEDICINE

## 2019-06-21 PROCEDURE — 3008F PR BODY MASS INDEX (BMI) DOCUMENTED: ICD-10-PCS | Mod: CPTII,S$GLB,, | Performed by: INTERNAL MEDICINE

## 2019-06-21 PROCEDURE — 86800 THYROGLOBULIN ANTIBODY: CPT | Mod: 91

## 2019-06-21 PROCEDURE — 99204 PR OFFICE/OUTPT VISIT, NEW, LEVL IV, 45-59 MIN: ICD-10-PCS | Mod: S$GLB,,, | Performed by: INTERNAL MEDICINE

## 2019-06-21 PROCEDURE — 84550 ASSAY OF BLOOD/URIC ACID: CPT

## 2019-06-21 PROCEDURE — 82306 VITAMIN D 25 HYDROXY: CPT

## 2019-06-21 PROCEDURE — 86376 MICROSOMAL ANTIBODY EACH: CPT

## 2019-06-21 PROCEDURE — 99999 PR PBB SHADOW E&M-EST. PATIENT-LVL IV: ICD-10-PCS | Mod: PBBFAC,,, | Performed by: INTERNAL MEDICINE

## 2019-06-21 PROCEDURE — 86316 IMMUNOASSAY TUMOR OTHER: CPT

## 2019-06-21 PROCEDURE — 84439 ASSAY OF FREE THYROXINE: CPT

## 2019-06-21 PROCEDURE — 84480 ASSAY TRIIODOTHYRONINE (T3): CPT

## 2019-06-21 PROCEDURE — 99999 PR PBB SHADOW E&M-EST. PATIENT-LVL IV: CPT | Mod: PBBFAC,,, | Performed by: INTERNAL MEDICINE

## 2019-06-21 PROCEDURE — 84443 ASSAY THYROID STIM HORMONE: CPT

## 2019-06-21 PROCEDURE — 85025 COMPLETE CBC W/AUTO DIFF WBC: CPT

## 2019-06-21 PROCEDURE — 83018 HEAVY METAL QUAN EACH NES: CPT

## 2019-06-21 NOTE — PROGRESS NOTES
Subjective:      Patient ID: Linda Rivera is a 38 y.o. female.    Chief Complaint:  Thyroid Nodule    Patient is a 38 yr old lady seen for initial care visit on account of thyroid nodular disease.    History of Present Illness    Patient is a 38 yr old lady seen for initial care visit on account of thyroid nodular disease.  The most recent thyroid USS available for review from 12/14 showed sub cm thyroid nodular disease at the time.  Her background history of comorbidities is as detailed below;     1. Nodular thyroid disease     2. Goiter     3. Thyroiditis     4. Abnormal thyroid blood test     5. Thyroid disease     6. Chronic migraine       Patients baseline [place] score is 7.  Patient was first noted to have nodules over 10yrs ago.  Patients mother and sister  Also has thyroid disease (nodules).  Patient was born and raised in [place].  Patient does not eat excess amounts of soy products, sea food and cabbage. She does not take sea weed nor kelp tablets. She has no history of significant radiation exposure.  Patients periods are regular monthly; K 6-7 days 23-25 days.  Grav 5 Para 5+0 (5 alive). Has not had any thyroid functional problems during pregnancy.  Patient does not smoke.  Patient rarely takes ETOH rarely.  Patient works with [employer].          Review of Systems   Constitutional: Negative for activity change, appetite change, chills, diaphoresis and fatigue.   HENT: Negative for congestion, ear discharge, ear pain, facial swelling, hearing loss, nosebleeds, sinus pressure, sore throat, trouble swallowing and voice change.    Eyes: Negative for photophobia and visual disturbance.   Respiratory: Negative for apnea, cough, chest tightness, shortness of breath, wheezing and stridor.    Cardiovascular: Negative for chest pain, palpitations and leg swelling.   Gastrointestinal: Negative for abdominal distention, abdominal pain, constipation, diarrhea and nausea.   Endocrine: Negative for cold  "intolerance, heat intolerance, polydipsia, polyphagia and polyuria.   Genitourinary: Negative for difficulty urinating, dysuria, flank pain, frequency, hematuria, menstrual problem and urgency.   Musculoskeletal: Negative for arthralgias, back pain, gait problem, joint swelling, myalgias, neck pain and neck stiffness.   Skin: Negative for color change and pallor.   Neurological: Negative for dizziness, tremors, seizures, syncope, weakness, light-headedness, numbness and headaches.   Hematological: Does not bruise/bleed easily.   Psychiatric/Behavioral: Negative for agitation, confusion, dysphoric mood, hallucinations and sleep disturbance. The patient is not nervous/anxious.        Objective: Ht 5' 5" (1.651 m)   Wt 67.8 kg (149 lb 7.6 oz)   BMI 24.87 kg/m²  /82 (BP Location: Left arm, Patient Position: Sitting, BP Method: Medium (Manual))   Pulse 60   Temp 98.1 °F (36.7 °C) (Oral)   Resp 16   Ht 5' 5" (1.651 m)   Wt 67.8 kg (149 lb 7.6 oz)   BMI 24.87 kg/m²  Body surface area is 1.76 meters squared.           Physical Exam   Constitutional: She is oriented to person, place, and time. Vital signs are normal. She appears well-developed and well-nourished. She does not appear ill. No distress.   HENT:   Head: Normocephalic and atraumatic. Not macrocephalic. Head is without right periorbital erythema and without left periorbital erythema. Hair is normal.   Nose: Nose normal.   Mouth/Throat: Oropharynx is clear and moist. Mucous membranes are not pale and not dry. No oropharyngeal exudate.   Eyes: Pupils are equal, round, and reactive to light. Conjunctivae, EOM and lids are normal. No scleral icterus.   Neck: Trachea normal, normal range of motion, full passive range of motion without pain and phonation normal. Neck supple. Normal carotid pulses and no JVD present. Carotid bruit is not present. No tracheal deviation present. Thyromegaly (mild and symmetric; no tender.) present. No thyroid mass present. "   Cardiovascular: Normal rate, regular rhythm, S1 normal, S2 normal, normal heart sounds, intact distal pulses and normal pulses. PMI is not displaced. Exam reveals no gallop.   No murmur heard.  Pulmonary/Chest: Effort normal and breath sounds normal. No respiratory distress. She has no wheezes. She has no rales.   Abdominal: Soft. Bowel sounds are normal. She exhibits no distension. There is no hepatosplenomegaly, splenomegaly or hepatomegaly. There is no tenderness. There is no guarding and no CVA tenderness. Hernia confirmed negative in the ventral area.   Musculoskeletal: She exhibits no edema or tenderness.        Right shoulder: She exhibits normal range of motion, no bony tenderness, no crepitus, no deformity, no pain, no spasm, normal pulse and normal strength.   No pedal edema nor calf swelling.   Lymphadenopathy:     She has no cervical adenopathy.        Right: No inguinal adenopathy present.        Left: No inguinal adenopathy present.   Neurological: She is alert and oriented to person, place, and time. She has normal strength and normal reflexes. She displays no atrophy, no tremor and normal reflexes. No cranial nerve deficit or sensory deficit. She exhibits normal muscle tone. Coordination and gait normal.   Skin: Skin is warm, dry and intact. No bruising, no ecchymosis, no petechiae and no rash noted. She is not diaphoretic. No cyanosis or erythema. No pallor. Nails show no clubbing.   Psychiatric: She has a normal mood and affect. Her speech is normal and behavior is normal. Judgment and thought content normal. Cognition and memory are normal.   Nursing note and vitals reviewed.      Lab Review:     US Soft Tissue Head Neck Thyroid   Order: [id number]   Status:  Final result   Visible to patient:  No (Not Released) Next appt:  None Dx:  Goiter   Details     Reading Physician Reading Date Result Priority   Anatoliy Boudreaux III, MD 12/22/2014       Narrative     Findings: The right lobe measures 4.7 x  1.4 x 1.7 cm, the left 4.8 x 1.2 x 1.6 cm.  There are 3 tiny nodules on the left the largest measuring 6 mm.  No adenopathy or masses seen.  No worrisome nodules are seen.      Impression      Small insignificant left thyroid nodules.      Electronically signed by: KALYANI WHITE  Date: 12/22/14  Time: 15:39            No recent labs available for review in [facility] data repository.    Assessment:     1. Hypovitaminosis D  Calcium, ionized    Vitamin D   2. Nodular thyroid disease  US Soft Tissue Head Neck Thyroid    T4, free    T3    Thyroglobulin    TSH    Chromogranin A    Calcitonin    Iodine, Serum   3. Goiter  Chromogranin A    Calcitonin    Iodine, Serum   4. Thyroiditis  TSH    Anti-thyroglobulin antibody    Thyroid peroxidase antibody    Comprehensive metabolic panel    CBC auto differential   5. Abnormal thyroid blood test  Lipid panel    Uric acid    Comprehensive metabolic panel   6. Thyroid disease     7. Chronic migraine        Regarding thyroid nodular disease; to obtain screening thyroid USS and relevant screening labs as detailed above.  Regarding chronic migraines; these dont appear to be sever enough at the moment to warrant long term prophylactic treatment but I did discuss with the patient regarding the potential utility for prophylactic medications as opposed to acute flare treatment options and she will think about that as inform us if the need for this arises in the future.  Regarding hypovitaminosis D; to check 25 OH Vit D levels and replete as needed.      Plan:       FFup in ~ 6mths.

## 2019-06-22 DIAGNOSIS — E55.9 HYPOVITAMINOSIS D: Primary | ICD-10-CM

## 2019-06-22 LAB
25(OH)D3+25(OH)D2 SERPL-MCNC: 12 NG/ML (ref 30–96)
ALBUMIN SERPL BCP-MCNC: 3.9 G/DL (ref 3.5–5.2)
ALP SERPL-CCNC: 41 U/L (ref 55–135)
ALT SERPL W/O P-5'-P-CCNC: 7 U/L (ref 10–44)
ANION GAP SERPL CALC-SCNC: 8 MMOL/L (ref 8–16)
AST SERPL-CCNC: 14 U/L (ref 10–40)
BILIRUB SERPL-MCNC: 0.5 MG/DL (ref 0.1–1)
BUN SERPL-MCNC: 6 MG/DL (ref 6–20)
CA-I BLDV-SCNC: 1.29 MMOL/L (ref 1.06–1.42)
CALCIUM SERPL-MCNC: 9.6 MG/DL (ref 8.7–10.5)
CHLORIDE SERPL-SCNC: 107 MMOL/L (ref 95–110)
CHOLEST SERPL-MCNC: 189 MG/DL (ref 120–199)
CHOLEST/HDLC SERPL: 3.2 {RATIO} (ref 2–5)
CO2 SERPL-SCNC: 24 MMOL/L (ref 23–29)
CREAT SERPL-MCNC: 0.9 MG/DL (ref 0.5–1.4)
EST. GFR  (AFRICAN AMERICAN): >60 ML/MIN/1.73 M^2
EST. GFR  (NON AFRICAN AMERICAN): >60 ML/MIN/1.73 M^2
GLUCOSE SERPL-MCNC: 76 MG/DL (ref 70–110)
HDLC SERPL-MCNC: 59 MG/DL (ref 40–75)
HDLC SERPL: 31.2 % (ref 20–50)
LDLC SERPL CALC-MCNC: 112.4 MG/DL (ref 63–159)
NONHDLC SERPL-MCNC: 130 MG/DL
POTASSIUM SERPL-SCNC: 3.7 MMOL/L (ref 3.5–5.1)
PROT SERPL-MCNC: 7.3 G/DL (ref 6–8.4)
SODIUM SERPL-SCNC: 139 MMOL/L (ref 136–145)
T3 SERPL-MCNC: 90 NG/DL (ref 60–180)
T4 FREE SERPL-MCNC: 1.04 NG/DL (ref 0.71–1.51)
TRIGL SERPL-MCNC: 88 MG/DL (ref 30–150)
TSH SERPL DL<=0.005 MIU/L-ACNC: 0.6 UIU/ML (ref 0.4–4)
URATE SERPL-MCNC: 4.6 MG/DL (ref 2.4–5.7)

## 2019-06-22 RX ORDER — ERGOCALCIFEROL 1.25 MG/1
50000 CAPSULE ORAL
Qty: 12 CAPSULE | Refills: 3 | Status: SHIPPED | OUTPATIENT
Start: 2019-06-22 | End: 2019-09-20

## 2019-06-24 ENCOUNTER — HOSPITAL ENCOUNTER (OUTPATIENT)
Dept: RADIOLOGY | Facility: HOSPITAL | Age: 39
Discharge: HOME | End: 2019-06-24
Attending: INTERNAL MEDICINE
Payer: COMMERCIAL

## 2019-06-24 DIAGNOSIS — E04.1 NODULAR THYROID DISEASE: ICD-10-CM

## 2019-06-24 LAB
IODINE SERPL-MCNC: 60 NG/ML (ref 40–92)
THRYOGLOBULIN INTERPRETATION: ABNORMAL
THYROGLOB AB SERPL IA-ACNC: <4 IU/ML (ref 0–3.9)
THYROGLOB AB SERPL-ACNC: <1.8 IU/ML
THYROGLOB SERPL-MCNC: 47 NG/ML
THYROPEROXIDASE IGG SERPL-ACNC: <6 IU/ML

## 2019-06-24 PROCEDURE — 76536 US EXAM OF HEAD AND NECK: CPT | Mod: 26,,, | Performed by: SPECIALIST

## 2019-06-24 PROCEDURE — 76536 US SOFT TISSUE HEAD NECK THYROID: ICD-10-PCS | Mod: 26,,, | Performed by: SPECIALIST

## 2019-06-24 PROCEDURE — 76536 US EXAM OF HEAD AND NECK: CPT | Mod: TC

## 2019-06-25 LAB — CALCIT SERPL-MCNC: <5 PG/ML

## 2019-06-26 LAB — CGA SERPL-MCNC: 55 NG/ML (ref 0–95)

## 2019-11-06 ENCOUNTER — OFFICE VISIT (OUTPATIENT)
Dept: OBSTETRICS AND GYNECOLOGY | Facility: CLINIC | Age: 39
End: 2019-11-06
Payer: COMMERCIAL

## 2019-11-06 VITALS
HEIGHT: 65 IN | DIASTOLIC BLOOD PRESSURE: 70 MMHG | SYSTOLIC BLOOD PRESSURE: 108 MMHG | WEIGHT: 144.81 LBS | BODY MASS INDEX: 24.12 KG/M2

## 2019-11-06 DIAGNOSIS — Z01.419 ENCOUNTER FOR GYNECOLOGICAL EXAMINATION WITHOUT ABNORMAL FINDING: Primary | ICD-10-CM

## 2019-11-06 PROCEDURE — 99999 PR PBB SHADOW E&M-EST. PATIENT-LVL III: CPT | Mod: PBBFAC,,, | Performed by: OBSTETRICS & GYNECOLOGY

## 2019-11-06 PROCEDURE — 99395 PREV VISIT EST AGE 18-39: CPT | Mod: S$GLB,,, | Performed by: OBSTETRICS & GYNECOLOGY

## 2019-11-06 PROCEDURE — 99999 PR PBB SHADOW E&M-EST. PATIENT-LVL III: ICD-10-PCS | Mod: PBBFAC,,, | Performed by: OBSTETRICS & GYNECOLOGY

## 2019-11-06 PROCEDURE — 99395 PR PREVENTIVE VISIT,EST,18-39: ICD-10-PCS | Mod: S$GLB,,, | Performed by: OBSTETRICS & GYNECOLOGY

## 2019-11-08 NOTE — PROGRESS NOTES
PT HERE FOR ANNUAL.    ROS:  GENERAL: No fever, chills, fatigability or weight loss.  VULVAR: No pain, no lesions and no itching.  VAGINAL: No relaxation, no itching, no discharge, no abnormal bleeding and no lesions.  ABDOMEN: No abdominal pain. Denies nausea. Denies vomiting. No diarrhea. No constipation  BREAST: Denies pain. No lumps. No discharge.  URINARY: No incontinence, no nocturia, no frequency and no dysuria.  CARDIOVASCULAR: No chest pain. No shortness of breath. No leg cramps.  NEUROLOGICAL: No headaches. No vision changes.  The remainder of the review of systems was negative.    PE:  General Appearance: normal weight And Well developed. Well nourished. In no acute distress.  Vulva: Lesions: No.  Urethral Meatus: Normal size. Normal location. No lesions. No prolapse.  Urethra: No masses. No tenderness. No prolapse. No scarring.  Bladder: No masses. No tenderness.  Vagina: Mucosa NI:yes discharge no, atrophy no, cystocele no or rectocele no.  Cervix: Lesion: no  Stenotic: no Cervical motion tenderness: no  Uterus: Uterus size: 6 weeks. Support good. Uterus size: Normal  Adnexa: Masses: No Tenderness: No CDS Nodularity: No  Abdomen: normal weight No masses. No tenderness.  Breasts: No bilateral masses. No bilateral discharge. No bilateral tenderness. No bilateral fibrocystic changes.  Neck: No thyroid enlargement. No thyroid masses.  Skin: Rashes: No      PROCEDURES:    PLAN:     DIAGNOSIS:  1. Encounter for gynecological examination without abnormal finding        MEDICATIONS & ORDERS:       Patient was counseled today on the new ACS guidelines for cervical cytology screening as well as the current recommendations for breast cancer screening. She was counseled to follow up with her PCP for other routine health maintenance. Counseling session lasted approximately 10 minutes, and all her questions were answered.         FOLLOW-UP: With me in 12 month

## 2019-11-11 ENCOUNTER — OFFICE VISIT (OUTPATIENT)
Dept: FAMILY MEDICINE | Facility: CLINIC | Age: 39
End: 2019-11-11
Payer: COMMERCIAL

## 2019-11-11 VITALS
WEIGHT: 143.5 LBS | BODY MASS INDEX: 23.91 KG/M2 | DIASTOLIC BLOOD PRESSURE: 64 MMHG | TEMPERATURE: 99 F | HEIGHT: 65 IN | SYSTOLIC BLOOD PRESSURE: 122 MMHG | HEART RATE: 60 BPM | OXYGEN SATURATION: 98 %

## 2019-11-11 PROBLEM — E06.9 THYROIDITIS: Status: RESOLVED | Noted: 2019-06-21 | Resolved: 2019-11-11

## 2019-11-11 PROBLEM — R79.89 ABNORMAL THYROID BLOOD TEST: Status: RESOLVED | Noted: 2019-06-21 | Resolved: 2019-11-11

## 2019-11-11 PROCEDURE — 99999 PR PBB SHADOW E&M-EST. PATIENT-LVL III: CPT | Mod: PBBFAC,,, | Performed by: FAMILY MEDICINE

## 2019-11-11 PROCEDURE — 99214 PR OFFICE/OUTPT VISIT, EST, LEVL IV, 30-39 MIN: ICD-10-PCS | Mod: S$GLB,,, | Performed by: FAMILY MEDICINE

## 2019-11-11 PROCEDURE — 3008F BODY MASS INDEX DOCD: CPT | Mod: CPTII,S$GLB,, | Performed by: FAMILY MEDICINE

## 2019-11-11 PROCEDURE — 3008F PR BODY MASS INDEX (BMI) DOCUMENTED: ICD-10-PCS | Mod: CPTII,S$GLB,, | Performed by: FAMILY MEDICINE

## 2019-11-11 PROCEDURE — 99999 PR PBB SHADOW E&M-EST. PATIENT-LVL III: ICD-10-PCS | Mod: PBBFAC,,, | Performed by: FAMILY MEDICINE

## 2019-11-11 PROCEDURE — 99214 OFFICE O/P EST MOD 30 MIN: CPT | Mod: S$GLB,,, | Performed by: FAMILY MEDICINE

## 2019-11-11 RX ORDER — PROPRANOLOL HYDROCHLORIDE 40 MG/1
40 TABLET ORAL DAILY PRN
Qty: 30 TABLET | Refills: 3 | Status: SHIPPED | OUTPATIENT
Start: 2019-11-11 | End: 2020-08-11

## 2019-11-11 RX ORDER — SUMATRIPTAN 50 MG/1
50 TABLET, FILM COATED ORAL
Qty: 30 TABLET | Refills: 1 | Status: SHIPPED | OUTPATIENT
Start: 2019-11-11 | End: 2020-07-06

## 2019-11-11 NOTE — PROGRESS NOTES
[facility]  Progress Note    SUBJECTIVE:     Chief Complaint   Patient presents with    Migraine       HPI   Linda Rivera  is a 39 y.o. female here for follow-up of her migraines. Not taking any medications as she didn't feel like it. Gets about 6 hours of sleep at night. Was getting 1 migraine a month but now is happening weekly, which has worsened over the past month. Takes otc meds without much relief. Has associated photo/phonophobia at times. Patient has no other new complaints/problems at this time.      Review of patient's allergies indicates:   Allergen Reactions    No known allergies        Past Medical History:   Diagnosis Date    Migraine headache      Past Surgical History:   Procedure Laterality Date    TUBAL LIGATION  2014    KJB     Family History   Problem Relation Age of Onset    Hypertension Mother     Cancer Father     Diabetes Sister     Heart disease Sister      Social History     Tobacco Use    Smoking status: Never Smoker    Smokeless tobacco: Never Used   Substance Use Topics    Alcohol use: No     Alcohol/week: 0.0 standard drinks     Frequency: Monthly or less     Drinks per session: 1 or 2     Binge frequency: Never     Comment: social    Drug use: No        Review of Systems   HENT: Negative for hearing loss.    Eyes: Negative for discharge.   Respiratory: Negative for wheezing.    Cardiovascular: Negative for chest pain and palpitations.   Gastrointestinal: Negative for blood in stool, constipation, diarrhea and vomiting.   Genitourinary: Negative for dysuria and hematuria.   Musculoskeletal: Negative for neck pain.   Neurological: Positive for headaches. Negative for weakness.   Endo/Heme/Allergies: Negative for polydipsia.     OBJECTIVE:     Vitals:    11/11/19 1550   BP: 122/64   Pulse: 60   Temp: 98.8 °F (37.1 °C)     Body mass index is 23.88 kg/m².    Physical Exam   Constitutional: She is oriented to person, place, and time and well-developed,  well-nourished, and in no distress. No distress.   HENT:   Head: Normocephalic and atraumatic.   Nose: Nose normal.   Eyes: Conjunctivae and EOM are normal.   Cardiovascular: Normal rate, regular rhythm and normal heart sounds. Exam reveals no gallop and no friction rub.   No murmur heard.  Pulmonary/Chest: Effort normal and breath sounds normal. No respiratory distress. She has no wheezes. She has no rales. She exhibits no tenderness.   Abdominal: Soft. Bowel sounds are normal. She exhibits no distension. There is no tenderness. There is no rebound.   Neurological: She is alert and oriented to person, place, and time. No cranial nerve deficit (no neurological deficits).   Skin: Skin is warm. She is not diaphoretic.       Old records were reviewed. Labs and/or images were independently reviewed.    ASSESSMENT     1. Chronic migraine        PLAN:     Chronic migraine  -     Start propranolol (INDERAL) 40 MG tablet; Take 1 tablet (40 mg total) by mouth daily as needed.  Dispense: 30 tablet; Refill: 3  -     sumatriptan (IMITREX) 50 MG tablet; Take 1 tablet (50 mg total) by mouth every 2 (two) hours as needed for Migraine (max 150 mg/day).  Dispense: 30 tablet; Refill: 1  -     Will start migraine prophylaxis therapy, goal is to decrease migraines by 50%. Currently she is getting about 1-2 migraines a week. Start with propranolol BID for now, and titrate up as needed.    RTC  PRMAAGN Delaney MD  11/11/2019 4:15 PM

## 2020-02-05 ENCOUNTER — OFFICE VISIT (OUTPATIENT)
Dept: OBSTETRICS AND GYNECOLOGY | Facility: CLINIC | Age: 40
End: 2020-02-05
Payer: COMMERCIAL

## 2020-02-05 VITALS — WEIGHT: 146.38 LBS | HEIGHT: 65 IN | BODY MASS INDEX: 24.39 KG/M2

## 2020-02-05 DIAGNOSIS — N89.8 VAGINAL IRRITATION: Primary | ICD-10-CM

## 2020-02-05 PROCEDURE — 99999 PR PBB SHADOW E&M-EST. PATIENT-LVL II: CPT | Mod: PBBFAC,,, | Performed by: OBSTETRICS & GYNECOLOGY

## 2020-02-05 PROCEDURE — 3008F PR BODY MASS INDEX (BMI) DOCUMENTED: ICD-10-PCS | Mod: CPTII,S$GLB,, | Performed by: OBSTETRICS & GYNECOLOGY

## 2020-02-05 PROCEDURE — 87661 TRICHOMONAS VAGINALIS AMPLIF: CPT

## 2020-02-05 PROCEDURE — 87481 CANDIDA DNA AMP PROBE: CPT | Mod: 59

## 2020-02-05 PROCEDURE — 3008F BODY MASS INDEX DOCD: CPT | Mod: CPTII,S$GLB,, | Performed by: OBSTETRICS & GYNECOLOGY

## 2020-02-05 PROCEDURE — 99213 OFFICE O/P EST LOW 20 MIN: CPT | Mod: S$GLB,,, | Performed by: OBSTETRICS & GYNECOLOGY

## 2020-02-05 PROCEDURE — 99999 PR PBB SHADOW E&M-EST. PATIENT-LVL II: ICD-10-PCS | Mod: PBBFAC,,, | Performed by: OBSTETRICS & GYNECOLOGY

## 2020-02-05 PROCEDURE — 99213 PR OFFICE/OUTPT VISIT, EST, LEVL III, 20-29 MIN: ICD-10-PCS | Mod: S$GLB,,, | Performed by: OBSTETRICS & GYNECOLOGY

## 2020-02-05 RX ORDER — CLOTRIMAZOLE AND BETAMETHASONE DIPROPIONATE 10; .64 MG/G; MG/G
CREAM TOPICAL
Qty: 45 G | Refills: 3 | Status: SHIPPED | OUTPATIENT
Start: 2020-02-05 | End: 2020-08-11

## 2020-02-07 LAB
BACTERIAL VAGINOSIS DNA: NEGATIVE
CANDIDA GLABRATA DNA: NEGATIVE
CANDIDA KRUSEI DNA: NEGATIVE
CANDIDA RRNA VAG QL PROBE: POSITIVE
T VAGINALIS RRNA GENITAL QL PROBE: NEGATIVE

## 2020-02-08 NOTE — PROGRESS NOTES
PT NOTICED VAGINAL IRRITATION.    ROS:  GENERAL: No fever, chills, fatigability or weight loss.  VULVAR: No pain, no lesions and no itching.  VAGINAL: No relaxation, no itching, no discharge, no abnormal bleeding and no lesions.  ABDOMEN: No abdominal pain. Denies nausea. Denies vomiting. No diarrhea. No constipation  BREAST: Denies pain. No lumps. No discharge.  URINARY: No incontinence, no nocturia, no frequency and no dysuria.  CARDIOVASCULAR: No chest pain. No shortness of breath. No leg cramps.  NEUROLOGICAL: No headaches. No vision changes.  The remainder of the review of systems was negative.    PE:  General Appearance: normal weight And Well developed. Well nourished. In no acute distress.  Vulva: Lesions: No.  Urethral Meatus: Normal size. Normal location. No lesions. No prolapse.  Urethra: No masses. No tenderness. No prolapse. No scarring.  Bladder: No masses. No tenderness.  Vagina: Mucosa NI:yes discharge no, atrophy no, cystocele no or rectocele no.  Cervix: Lesion: no  Stenotic: no Cervical motion tenderness: no    PROCEDURES:    PLAN:     DIAGNOSIS:  1. Vaginal irritation        MEDICATIONS & ORDERS:  Orders Placed This Encounter    Vaginosis Screen by DNA Probe    clotrimazole-betamethasone 1-0.05% (LOTRISONE) cream    terconazole (TERAZOL 7) 0.4 % Crea       Patient was counseled today on the new ACS guidelines for cervical cytology screening as well as the current recommendations for breast cancer screening. She was counseled to follow up with her PCP for other routine health maintenance. Counseling session lasted approximately 10 minutes, and all her questions were answered.         FOLLOW-UP: With me PRN

## 2020-02-11 RX ORDER — TERCONAZOLE 4 MG/G
1 CREAM VAGINAL NIGHTLY
Qty: 1 TUBE | Refills: 1 | Status: SHIPPED | OUTPATIENT
Start: 2020-02-11 | End: 2020-02-18

## 2020-03-07 ENCOUNTER — HOSPITAL ENCOUNTER (EMERGENCY)
Facility: HOSPITAL | Age: 40
Discharge: HOME | End: 2020-03-07
Attending: EMERGENCY MEDICINE
Payer: COMMERCIAL

## 2020-03-07 VITALS
RESPIRATION RATE: 16 BRPM | OXYGEN SATURATION: 99 % | HEART RATE: 90 BPM | TEMPERATURE: 98 F | DIASTOLIC BLOOD PRESSURE: 82 MMHG | BODY MASS INDEX: 24.46 KG/M2 | WEIGHT: 147 LBS | SYSTOLIC BLOOD PRESSURE: 135 MMHG

## 2020-03-07 DIAGNOSIS — J10.1 INFLUENZA A: Primary | ICD-10-CM

## 2020-03-07 LAB
B-HCG UR QL: NEGATIVE
CTP QC/QA: YES
CTP QC/QA: YES
POC MOLECULAR INFLUENZA A AGN: POSITIVE
POC MOLECULAR INFLUENZA B AGN: NEGATIVE

## 2020-03-07 PROCEDURE — 99284 EMERGENCY DEPT VISIT MOD MDM: CPT | Mod: 25,ER

## 2020-03-07 PROCEDURE — 87502 INFLUENZA DNA AMP PROBE: CPT | Mod: ER

## 2020-03-07 PROCEDURE — 81025 URINE PREGNANCY TEST: CPT | Mod: ER | Performed by: EMERGENCY MEDICINE

## 2020-03-07 RX ORDER — BENZONATATE 100 MG/1
100 CAPSULE ORAL 3 TIMES DAILY PRN
Qty: 30 CAPSULE | Refills: 0 | Status: SHIPPED | OUTPATIENT
Start: 2020-03-07 | End: 2020-03-17

## 2020-03-07 RX ORDER — IBUPROFEN 600 MG/1
600 TABLET ORAL EVERY 6 HOURS PRN
Qty: 24 TABLET | Refills: 0 | Status: SHIPPED | OUTPATIENT
Start: 2020-03-07 | End: 2020-08-11

## 2020-03-07 RX ORDER — OSELTAMIVIR PHOSPHATE 75 MG/1
75 CAPSULE ORAL 2 TIMES DAILY
Qty: 10 CAPSULE | Refills: 0 | Status: SHIPPED | OUTPATIENT
Start: 2020-03-07 | End: 2020-03-12

## 2020-03-07 NOTE — DISCHARGE INSTRUCTIONS
Follow-up with PCP in 2 days.  Return ED for worsening of symptoms.  Tylenol and Motrin as needed for fever.

## 2020-03-07 NOTE — ED PROVIDER NOTES
"Encounter Date: 3/7/2020    SCRIBE #1 NOTE: I, Aline Clark, am scribing for, and in the presence of,  TAYO Wilcox. I have scribed the following portions of the note - Other sections scribed: HPI, ROS, PE.       History     Chief Complaint   Patient presents with    URI     Pt states,"Cough with congestion and my right ear hurts for three days."     39 y.o. female with cough and congestion x 3 days. Patient is also experiencing right ear pain       The history is provided by the patient. No  was used.   URI   The primary symptoms include ear pain and cough. Primary symptoms do not include fever, headaches, sore throat, wheezing, nausea, vomiting, myalgias or rash. The current episode started several days ago (3 days ago). This is a new problem. The problem has not changed since onset.  The ear pain began more than 2 days ago (3 days ago). Ear pain is a new problem. The right ear is affected.   Symptoms associated with the illness include congestion. The illness is not associated with rhinorrhea.     Review of patient's allergies indicates:   Allergen Reactions    No known allergies      Past Medical History:   Diagnosis Date    Migraine headache      Past Surgical History:   Procedure Laterality Date    TUBAL LIGATION  2014    [facility]     Family History   Problem Relation Age of Onset    Hypertension Mother     Cancer Father     Diabetes Sister     Heart disease Sister      Social History     Tobacco Use    Smoking status: Never Smoker    Smokeless tobacco: Never Used   Substance Use Topics    Alcohol use: No     Alcohol/week: 0.0 standard drinks     Frequency: Monthly or less     Drinks per session: 1 or 2     Binge frequency: Never     Comment: social    Drug use: No     Review of Systems   Constitutional: Negative.  Negative for fever.   HENT: Positive for congestion and ear pain. Negative for rhinorrhea and sore throat.    Eyes: Negative.    Respiratory: Positive for cough. " Negative for shortness of breath and wheezing.    Cardiovascular: Negative.  Negative for chest pain.   Gastrointestinal: Negative.  Negative for nausea and vomiting.   Endocrine: Negative.    Genitourinary: Negative.    Musculoskeletal: Negative.  Negative for myalgias.   Skin: Negative.  Negative for rash.   Allergic/Immunologic: Negative.    Neurological: Negative.  Negative for headaches.   Hematological: Negative.    Psychiatric/Behavioral: Negative.    All other systems reviewed and are negative.      Physical Exam     Initial Vitals [03/07/20 1138]   BP Pulse Resp Temp SpO2   135/82 90 16 98.4 °F (36.9 °C) 99 %      MAP       --         Physical Exam    Nursing note and vitals reviewed.  Constitutional: She appears well-developed.   HENT:   Right Ear: External ear normal.   Left Ear: External ear normal.   Nose: Mucosal edema present.   Mouth/Throat: Oropharynx is clear and moist.   Eyes: Conjunctivae are normal.   Neck: Normal range of motion. Neck supple.   Cardiovascular: Normal rate, regular rhythm, normal heart sounds and intact distal pulses. Exam reveals no friction rub.    Pulmonary/Chest: Effort normal and breath sounds normal.   Abdominal: Soft. She exhibits no distension.   Musculoskeletal: Normal range of motion. She exhibits no edema or tenderness.   Neurological: She is alert and oriented to person, place, and time.   Skin: Skin is warm and dry.   Psychiatric: She has a normal mood and affect. Her behavior is normal.         ED Course   Procedures  Labs Reviewed   POCT INFLUENZA A/B MOLECULAR - Abnormal; Notable for the following components:       Result Value    POC Molecular Influenza A Ag Positive (*)     All other components within normal limits   POCT URINE PREGNANCY          Imaging Results    None          Medical Decision Making:   History:   Old Medical Records: I decided to obtain old medical records.  Initial Assessment:   This is a nontoxic appearing 39 year old female with cough,  congestion, and right ear pain.  Differential Diagnosis:   Influenza A, Influenza B, URI, Acute sinusitis   Clinical Tests:   Lab Tests: Ordered and Reviewed  The following lab test(s) were unremarkable: UPT  ED Management:  Discharge with Tamiflu, Tessalon perles, and Motrin.   Follow-up with PCP in 2 days.   Return to ED for worsening of symptoms.             Scribe Attestation:   Scribe #1: I performed the above scribed service and the documentation accurately describes the services I performed. I attest to the accuracy of the note.    This document was produced by a scribe under my direction and in my presence. I agree with the content of the note and have made any necessary edits.     TAYO Wilcox    03/07/2020 5:00 PM                      Clinical Impression:     1. Influenza A                ED Disposition Condition    Discharge Stable        ED Prescriptions     Medication Sig Dispense Start Date End Date Auth. Provider    oseltamivir (TAMIFLU) 75 MG capsule Take 1 capsule (75 mg total) by mouth 2 (two) times daily. for 5 days 10 capsule 3/7/2020 3/12/2020 TAYO Blank    benzonatate (TESSALON) 100 MG capsule Take 1 capsule (100 mg total) by mouth 3 (three) times daily as needed for Cough. 30 capsule 3/7/2020 3/17/2020 TAYO Blank    ibuprofen (ADVIL,MOTRIN) 600 MG tablet Take 1 tablet (600 mg total) by mouth every 6 (six) hours as needed for Pain. 24 tablet 3/7/2020  TAYO Blank        Follow-up Information     Follow up With Specialties Details Why Contact Info    Peter Delaney MD Family Medicine   [address] [place] [address]  [place] [id number]  [phone]                                       TAYO Blank  03/07/20 1232

## 2020-07-06 RX ORDER — SUMATRIPTAN 50 MG/1
TABLET, FILM COATED ORAL
Qty: 27 TABLET | Refills: 0 | Status: SHIPPED | OUTPATIENT
Start: 2020-07-06 | End: 2020-08-11 | Stop reason: SDUPTHER

## 2020-07-06 NOTE — PROGRESS NOTES
Refill Routing Note   Medication(s) are not appropriate for processing by [facility]:       - HIGH DOSE(Daily dose of 600 mg exceeds recommended maximum of 200 mg, over by 200%)  - Patient has been seen in the Emergency Room/Department since the last PCP visit  - Medication is a new start (<3 months)        Medication Therapy Plan: HIGH DOSE(Daily dose of 600 mg exceeds recommended maximum of 200 mg, over by 200%)/MEDICATION IS A NEW START; ED VISIT(3/6/20-INFLUENZA), DEFER TO YOU  Medication reconciliation completed: No      Automatic Epic Generated Protocol Data:    Requested Prescriptions   Pending Prescriptions Disp Refills    sumatriptan (IMITREX) 50 MG tablet [Pharmacy Med Name: SUMATRIPTAN 50MG TABLETS] 27 tablet 0     Sig: TAKE 1 TABLET(50 MG) BY MOUTH EVERY 2 HOURS AS NEEDED FOR MIGRAINE.  MG PER DAY       Neurology:  Migraine Therapy - Triptan Passed - 7/2/2020  9:14 PM        Passed - Patient is at least 18 years old        Passed - Negative Pregnancy Status Check        Passed - No contraindicated diagnoses on problem list     Chest Pain  Angina  Myocardial Infarction (MI)  Ischemic Heart Disease  Coronary Artery Disease              Passed - Last BP in normal range within 360 days.     BP Readings from Last 3 Encounters:   03/07/20 135/82   11/11/19 122/64   11/06/19 108/70              Passed - Office visit in past 12 months or future 90 days.     Recent Outpatient Visits            5 months ago Vaginal irritation    [facility] [address] Jr., MD    7 months ago Chronic migraine    [facility] Peter Delaney MD    8 months ago Encounter for gynecological examination without abnormal finding    [facility] [address] Oliverio Bazan Jr., MD    1 year ago Hypovitaminosis D    [place] - [facility]o/Diabetes Ryan Richmond MD    1 year ago Foot pain, right    [facility] - Podiatry Liz Hay DPM          Future Appointments              In 2  months MD Sergei Iniguez - Endo/Diabetes, [place]                      Appointments  past 12m or future 3m with PCP    Date Provider   Last Visit   11/11/2019 Peter Delaney MD   Next Visit   Visit date not found Peter Delaney MD   ED visits in past 90 days: 0     Note composed:8:30 AM 07/06/2020

## 2020-08-11 ENCOUNTER — OFFICE VISIT (OUTPATIENT)
Dept: FAMILY MEDICINE | Facility: CLINIC | Age: 40
End: 2020-08-11
Payer: COMMERCIAL

## 2020-08-11 VITALS
BODY MASS INDEX: 25.22 KG/M2 | HEIGHT: 65 IN | TEMPERATURE: 97 F | OXYGEN SATURATION: 97 % | WEIGHT: 151.38 LBS | DIASTOLIC BLOOD PRESSURE: 80 MMHG | HEART RATE: 69 BPM | SYSTOLIC BLOOD PRESSURE: 100 MMHG

## 2020-08-11 DIAGNOSIS — Z00.00 ROUTINE PHYSICAL EXAMINATION: Primary | ICD-10-CM

## 2020-08-11 DIAGNOSIS — G47.00 INSOMNIA, UNSPECIFIED TYPE: ICD-10-CM

## 2020-08-11 PROCEDURE — 99999 PR PBB SHADOW E&M-EST. PATIENT-LVL III: ICD-10-PCS | Mod: PBBFAC,,, | Performed by: FAMILY MEDICINE

## 2020-08-11 PROCEDURE — 99395 PR PREVENTIVE VISIT,EST,18-39: ICD-10-PCS | Mod: S$GLB,,, | Performed by: FAMILY MEDICINE

## 2020-08-11 PROCEDURE — 99395 PREV VISIT EST AGE 18-39: CPT | Mod: S$GLB,,, | Performed by: FAMILY MEDICINE

## 2020-08-11 PROCEDURE — 99999 PR PBB SHADOW E&M-EST. PATIENT-LVL III: CPT | Mod: PBBFAC,,, | Performed by: FAMILY MEDICINE

## 2020-08-11 RX ORDER — SUMATRIPTAN 50 MG/1
TABLET, FILM COATED ORAL
Qty: 30 TABLET | Refills: 0 | Status: SHIPPED | OUTPATIENT
Start: 2020-08-11 | End: 2020-11-22 | Stop reason: SDUPTHER

## 2020-08-11 NOTE — PROGRESS NOTES
[facility]  Progress Note    SUBJECTIVE:     Chief Complaint   Patient presents with    biometric screening       HPI   Linda Rivera  is a 39 y.o. female here for routine physical exam. Patient has no other new complaints/problems at this time.      Review of patient's allergies indicates:   Allergen Reactions    No known allergies        Past Medical History:   Diagnosis Date    Migraine headache      Past Surgical History:   Procedure Laterality Date    TUBAL LIGATION  2014    [facility]     Family History   Problem Relation Age of Onset    Hypertension Mother     Cancer Father     Diabetes Sister     Heart disease Sister      Social History     Tobacco Use    Smoking status: Never Smoker    Smokeless tobacco: Never Used   Substance Use Topics    Alcohol use: No     Alcohol/week: 0.0 standard drinks     Frequency: Monthly or less     Drinks per session: 1 or 2     Binge frequency: Never     Comment: social    Drug use: No        Review of Systems   Constitutional: Negative for chills, diaphoresis and fever.   HENT: Negative for congestion, ear pain, hearing loss and sore throat.    Eyes: Negative for photophobia and discharge.   Respiratory: Negative for cough, shortness of breath and wheezing.    Cardiovascular: Negative for chest pain and palpitations.   Gastrointestinal: Negative for abdominal pain, constipation, diarrhea, nausea and vomiting.   Genitourinary: Negative for dysuria and hematuria.   Musculoskeletal: Negative for back pain and myalgias.   Skin: Negative for itching and rash.   Neurological: Positive for headaches. Negative for dizziness, sensory change, focal weakness and weakness.   Psychiatric/Behavioral: The patient has insomnia.    All other systems reviewed and are negative.    OBJECTIVE:     Vitals:    08/11/20 0920   BP: 100/80   Pulse: 69   Temp: 97.2 °F (36.2 °C)     Body mass index is 25.19 kg/m².    Physical Exam   Constitutional: She is oriented to person, place, and  time and well-developed, well-nourished, and in no distress. No distress.   HENT:   Head: Normocephalic and atraumatic.   Right Ear: Tympanic membrane is not perforated, not erythematous and not bulging. No hemotympanum.   Left Ear: Tympanic membrane is not perforated, not erythematous and not bulging. No hemotympanum.   Mouth/Throat: Oropharynx is clear and moist. No oropharyngeal exudate.   Eyes: Pupils are equal, round, and reactive to light. Conjunctivae and EOM are normal.   Neck: No thyromegaly present.   Cardiovascular: Normal rate, regular rhythm and normal heart sounds. Exam reveals no gallop and no friction rub.   No murmur heard.  Pulmonary/Chest: Effort normal and breath sounds normal. No respiratory distress. She has no wheezes. She has no rales.   Abdominal: Soft. Bowel sounds are normal. She exhibits no distension. There is no abdominal tenderness. There is no rebound and no guarding.   Musculoskeletal: Normal range of motion.         General: No tenderness or edema.   Lymphadenopathy:     She has no cervical adenopathy.   Neurological: She is alert and oriented to person, place, and time.   Skin: Skin is warm. No rash noted. She is not diaphoretic. No erythema.       Old records were reviewed. Labs and/or images were independently reviewed.    ASSESSMENT     1. Routine physical examination    2. Chronic migraine    3. Insomnia, unspecified type        PLAN:     Routine physical examination  -     CBC auto differential; Future  -     Comprehensive metabolic panel; Future  -     Hemoglobin A1C; Future  -     Lipid Panel; Future  -     TSH; Future  -     T4, free; Future  -     Vitamin D; Future; Expected date: 08/11/2020  -     We briefly discussed diet, exercise, and routine preventive exams. All questions and comments addressed.    Chronic migraine  -     sumatriptan (IMITREX) 50 MG tablet; TAKE 1 TABLET(50 MG) BY MOUTH EVERY 2 HOURS AS NEEDED FOR MIGRAINE.  MG PER DAY  Dispense: 30 tablet;  Refill: 0    Insomnia, unspecified type   -     Educated patient about good sleep hygiene habits. No cell phone or TV around bedtime and turn off all lights in room when going to sleep. Take medications as prescribed.   -     Will try melatonin first.    RTC PRMAGAN Delaney MD  08/11/2020 9:37 AM

## 2020-08-14 DIAGNOSIS — Z11.59 NEED FOR HEPATITIS C SCREENING TEST: ICD-10-CM

## 2020-08-15 ENCOUNTER — LAB VISIT (OUTPATIENT)
Dept: LAB | Facility: HOSPITAL | Age: 40
End: 2020-08-15
Attending: FAMILY MEDICINE
Payer: COMMERCIAL

## 2020-08-15 DIAGNOSIS — Z00.00 ROUTINE PHYSICAL EXAMINATION: ICD-10-CM

## 2020-08-15 PROCEDURE — 36415 COLL VENOUS BLD VENIPUNCTURE: CPT | Mod: PO

## 2020-08-15 PROCEDURE — 80061 LIPID PANEL: CPT

## 2020-08-15 PROCEDURE — 80053 COMPREHEN METABOLIC PANEL: CPT

## 2020-08-15 PROCEDURE — 82306 VITAMIN D 25 HYDROXY: CPT

## 2020-08-15 PROCEDURE — 84439 ASSAY OF FREE THYROXINE: CPT

## 2020-08-15 PROCEDURE — 85025 COMPLETE CBC W/AUTO DIFF WBC: CPT

## 2020-08-15 PROCEDURE — 84443 ASSAY THYROID STIM HORMONE: CPT

## 2020-08-15 PROCEDURE — 83036 HEMOGLOBIN GLYCOSYLATED A1C: CPT

## 2020-08-16 LAB
25(OH)D3+25(OH)D2 SERPL-MCNC: 16 NG/ML (ref 30–96)
ALBUMIN SERPL BCP-MCNC: 3.9 G/DL (ref 3.5–5.2)
ALP SERPL-CCNC: 40 U/L (ref 55–135)
ALT SERPL W/O P-5'-P-CCNC: 6 U/L (ref 10–44)
ANION GAP SERPL CALC-SCNC: 9 MMOL/L (ref 8–16)
AST SERPL-CCNC: 14 U/L (ref 10–40)
BASOPHILS # BLD AUTO: 0.02 K/UL (ref 0–0.2)
BASOPHILS NFR BLD: 0.4 % (ref 0–1.9)
BILIRUB SERPL-MCNC: 0.5 MG/DL (ref 0.1–1)
BUN SERPL-MCNC: 14 MG/DL (ref 6–20)
CALCIUM SERPL-MCNC: 8.7 MG/DL (ref 8.7–10.5)
CHLORIDE SERPL-SCNC: 111 MMOL/L (ref 95–110)
CHOLEST SERPL-MCNC: 204 MG/DL (ref 120–199)
CHOLEST/HDLC SERPL: 4.2 {RATIO} (ref 2–5)
CO2 SERPL-SCNC: 22 MMOL/L (ref 23–29)
CREAT SERPL-MCNC: 0.8 MG/DL (ref 0.5–1.4)
DIFFERENTIAL METHOD: ABNORMAL
EOSINOPHIL # BLD AUTO: 0.6 K/UL (ref 0–0.5)
EOSINOPHIL NFR BLD: 11.8 % (ref 0–8)
ERYTHROCYTE [DISTWIDTH] IN BLOOD BY AUTOMATED COUNT: 14.2 % (ref 11.5–14.5)
EST. GFR  (AFRICAN AMERICAN): >60 ML/MIN/1.73 M^2
EST. GFR  (NON AFRICAN AMERICAN): >60 ML/MIN/1.73 M^2
ESTIMATED AVG GLUCOSE: 105 MG/DL (ref 68–131)
GLUCOSE SERPL-MCNC: 93 MG/DL (ref 70–110)
HBA1C MFR BLD HPLC: 5.3 % (ref 4–5.6)
HCT VFR BLD AUTO: 39.4 % (ref 37–48.5)
HDLC SERPL-MCNC: 49 MG/DL (ref 40–75)
HDLC SERPL: 24 % (ref 20–50)
HGB BLD-MCNC: 12.1 G/DL (ref 12–16)
IMM GRANULOCYTES # BLD AUTO: 0.01 K/UL (ref 0–0.04)
IMM GRANULOCYTES NFR BLD AUTO: 0.2 % (ref 0–0.5)
LDLC SERPL CALC-MCNC: 136.4 MG/DL (ref 63–159)
LYMPHOCYTES # BLD AUTO: 1.8 K/UL (ref 1–4.8)
LYMPHOCYTES NFR BLD: 34 % (ref 18–48)
MCH RBC QN AUTO: 29.7 PG (ref 27–31)
MCHC RBC AUTO-ENTMCNC: 30.7 G/DL (ref 32–36)
MCV RBC AUTO: 97 FL (ref 82–98)
MONOCYTES # BLD AUTO: 0.4 K/UL (ref 0.3–1)
MONOCYTES NFR BLD: 7.3 % (ref 4–15)
NEUTROPHILS # BLD AUTO: 2.5 K/UL (ref 1.8–7.7)
NEUTROPHILS NFR BLD: 46.3 % (ref 38–73)
NONHDLC SERPL-MCNC: 155 MG/DL
NRBC BLD-RTO: 0 /100 WBC
PLATELET # BLD AUTO: 267 K/UL (ref 150–350)
PMV BLD AUTO: 11.4 FL (ref 9.2–12.9)
POTASSIUM SERPL-SCNC: 4.2 MMOL/L (ref 3.5–5.1)
PROT SERPL-MCNC: 7.2 G/DL (ref 6–8.4)
RBC # BLD AUTO: 4.07 M/UL (ref 4–5.4)
SODIUM SERPL-SCNC: 142 MMOL/L (ref 136–145)
T4 FREE SERPL-MCNC: 1.07 NG/DL (ref 0.71–1.51)
TRIGL SERPL-MCNC: 93 MG/DL (ref 30–150)
TSH SERPL DL<=0.005 MIU/L-ACNC: 1.02 UIU/ML (ref 0.4–4)
WBC # BLD AUTO: 5.32 K/UL (ref 3.9–12.7)

## 2020-08-17 ENCOUNTER — TELEPHONE (OUTPATIENT)
Dept: FAMILY MEDICINE | Facility: CLINIC | Age: 40
End: 2020-08-17

## 2020-08-17 DIAGNOSIS — E55.9 VITAMIN D DEFICIENCY: Primary | ICD-10-CM

## 2020-08-17 RX ORDER — ERGOCALCIFEROL 1.25 MG/1
50000 CAPSULE ORAL
Qty: 12 CAPSULE | Refills: 3 | Status: SHIPPED | OUTPATIENT
Start: 2020-08-17 | End: 2021-06-19

## 2020-09-09 ENCOUNTER — PATIENT OUTREACH (OUTPATIENT)
Dept: ADMINISTRATIVE | Facility: OTHER | Age: 40
End: 2020-09-09

## 2020-09-11 ENCOUNTER — LAB VISIT (OUTPATIENT)
Dept: LAB | Facility: HOSPITAL | Age: 40
End: 2020-09-11
Attending: INTERNAL MEDICINE
Payer: COMMERCIAL

## 2020-09-11 ENCOUNTER — OFFICE VISIT (OUTPATIENT)
Dept: ENDOCRINOLOGY | Facility: CLINIC | Age: 40
End: 2020-09-11
Payer: COMMERCIAL

## 2020-09-11 VITALS
HEART RATE: 77 BPM | SYSTOLIC BLOOD PRESSURE: 130 MMHG | DIASTOLIC BLOOD PRESSURE: 70 MMHG | HEIGHT: 65 IN | TEMPERATURE: 98 F | BODY MASS INDEX: 26.04 KG/M2 | WEIGHT: 156.31 LBS

## 2020-09-11 DIAGNOSIS — E04.1 NODULAR THYROID DISEASE: ICD-10-CM

## 2020-09-11 DIAGNOSIS — E07.9 THYROID DISEASE: ICD-10-CM

## 2020-09-11 DIAGNOSIS — E55.9 HYPOVITAMINOSIS D: ICD-10-CM

## 2020-09-11 DIAGNOSIS — E06.9 THYROIDITIS: ICD-10-CM

## 2020-09-11 DIAGNOSIS — E04.1 NODULAR THYROID DISEASE: Primary | ICD-10-CM

## 2020-09-11 DIAGNOSIS — R79.89 ABNORMAL THYROID BLOOD TEST: ICD-10-CM

## 2020-09-11 DIAGNOSIS — E04.9 GOITER: ICD-10-CM

## 2020-09-11 PROCEDURE — 99214 OFFICE O/P EST MOD 30 MIN: CPT | Mod: S$GLB,,, | Performed by: INTERNAL MEDICINE

## 2020-09-11 PROCEDURE — 86316 IMMUNOASSAY TUMOR OTHER: CPT

## 2020-09-11 PROCEDURE — 82308 ASSAY OF CALCITONIN: CPT

## 2020-09-11 PROCEDURE — 3008F PR BODY MASS INDEX (BMI) DOCUMENTED: ICD-10-PCS | Mod: CPTII,S$GLB,, | Performed by: INTERNAL MEDICINE

## 2020-09-11 PROCEDURE — 3008F BODY MASS INDEX DOCD: CPT | Mod: CPTII,S$GLB,, | Performed by: INTERNAL MEDICINE

## 2020-09-11 PROCEDURE — 83018 HEAVY METAL QUAN EACH NES: CPT

## 2020-09-11 PROCEDURE — 84432 ASSAY OF THYROGLOBULIN: CPT

## 2020-09-11 PROCEDURE — 36415 COLL VENOUS BLD VENIPUNCTURE: CPT | Mod: PO

## 2020-09-11 PROCEDURE — 84550 ASSAY OF BLOOD/URIC ACID: CPT

## 2020-09-11 PROCEDURE — 99214 PR OFFICE/OUTPT VISIT, EST, LEVL IV, 30-39 MIN: ICD-10-PCS | Mod: S$GLB,,, | Performed by: INTERNAL MEDICINE

## 2020-09-11 PROCEDURE — 84480 ASSAY TRIIODOTHYRONINE (T3): CPT

## 2020-09-11 PROCEDURE — 99999 PR PBB SHADOW E&M-EST. PATIENT-LVL III: ICD-10-PCS | Mod: PBBFAC,,, | Performed by: INTERNAL MEDICINE

## 2020-09-11 PROCEDURE — 99999 PR PBB SHADOW E&M-EST. PATIENT-LVL III: CPT | Mod: PBBFAC,,, | Performed by: INTERNAL MEDICINE

## 2020-09-11 NOTE — PROGRESS NOTES
Subjective:      Patient ID: Linda Rivera is a 39 y.o. female.    Chief Complaint:      Patient is a 39 yr old lady [facility] account of thyroid nodular disease.       History of Present Illness    Patient is a 39 yr old lady seen in [facility] today on account of thyroid nodular disease.  The most recent thyroid USS available for review from 12/14 showed sub cm thyroid nodular disease at the time.  Her background history of comorbidities is as detailed below;      1. Nodular thyroid disease      2. Goiter      3. Thyroiditis      4. Abnormal thyroid blood test      5. Thyroid disease      6. Chronic migraine         Patients baseline Sharon score is 7.  Patient was first noted to have nodules over 10yrs ago.  Patients mother and sister  Also has thyroid disease (nodules).  Patient was born and raised in [place].  Patient does not eat excess amounts of soy products, sea food and cabbage. She does not take sea weed nor kelp tablets. She has no history of significant radiation exposure.  Patients periods are regular monthly; K 6-7 days 23-25 days.  Grav 5 Para 5+0 (5 alive). Has not had any thyroid functional problems during pregnancy.  Patient does not smoke.  Patient rarely takes ETOH rarely.  Patient works with [employer].  She denies any voice hoarseness, no dysohagia, odynophagia nor neck pain or swelling.       Review of Systems   Constitutional: Negative for fatigue, fever and unexpected weight change.   HENT: Negative for facial swelling, trouble swallowing and voice change.    Eyes: Negative for photophobia and visual disturbance.   Gastrointestinal: Negative for abdominal distention, abdominal pain, nausea and vomiting.   Endocrine: Negative for cold intolerance, heat intolerance, polydipsia and polyuria.   Genitourinary: Negative for dysuria, frequency and menstrual problem.   Musculoskeletal: Negative for back pain, joint swelling and neck pain.   Skin: Negative for color change, pallor and  "rash.   Neurological: Negative for dizziness, weakness and headaches.   Hematological: Does not bruise/bleed easily.   Psychiatric/Behavioral: Negative for confusion and sleep disturbance. The patient is not nervous/anxious.        Objective: /70 (BP Location: Right arm, Patient Position: Sitting, BP Method: Small (Manual))   Pulse 77   Temp 98 °F (36.7 °C) (Temporal)   Ht 5' 5" (1.651 m)   Wt 70.9 kg (156 lb 4.9 oz)   BMI 26.01 kg/m²  Body surface area is 1.8 meters squared.         Physical Exam  Vitals signs reviewed.   Constitutional:       Appearance: Normal appearance. She is normal weight.      Comments: Pleasant young lady. Not pale, anicteric and afebrile. Well hydrated. Clinically comfortable. Not in any acute distress. See today with her three children present.   HENT:      Head: Normocephalic and atraumatic.      Nose: Nose normal.   Eyes:      General: No scleral icterus.     Extraocular Movements: Extraocular movements intact.      Conjunctiva/sclera: Conjunctivae normal.      Pupils: Pupils are equal, round, and reactive to light.   Neck:      Musculoskeletal: Normal range of motion and neck supple. No neck rigidity or muscular tenderness.      Thyroid: Thyromegaly present. No thyroid tenderness.      Vascular: No carotid bruit.      Trachea: Trachea and phonation normal. No tracheal deviation.     Cardiovascular:      Rate and Rhythm: Normal rate and regular rhythm.      Pulses: Normal pulses.      Heart sounds: No murmur.   Pulmonary:      Effort: Pulmonary effort is normal. No respiratory distress.      Breath sounds: Normal breath sounds. No stridor. No wheezing, rhonchi or rales.   Abdominal:      General: Abdomen is flat. There is no distension.      Tenderness: There is no abdominal tenderness.   Musculoskeletal: Normal range of motion.         General: No swelling or tenderness.   Lymphadenopathy:      Cervical: No cervical adenopathy.   Skin:     General: Skin is warm and dry.      " Coloration: Skin is not jaundiced or pale.      Findings: No rash.   Neurological:      Mental Status: She is alert and oriented to person, place, and time. Mental status is at baseline.      Cranial Nerves: No cranial nerve deficit.   Psychiatric:         Mood and Affect: Mood normal.         Behavior: Behavior normal.         Judgment: Judgment normal.         Lab Review:     Results for BASSAM ADKINS (MRN [id number]) as of 9/11/2020 11:04   Ref. Range 3/7/2020 12:48 8/15/2020 09:23   WBC Latest Ref Range: 3.90 - 12.70 K/uL  5.32   RBC Latest Ref Range: 4.00 - 5.40 M/uL  4.07   Hemoglobin Latest Ref Range: 12.0 - 16.0 g/dL  12.1   Hematocrit Latest Ref Range: 37.0 - 48.5 %  39.4   MCV Latest Ref Range: 82 - 98 fL  97   MCH Latest Ref Range: 27.0 - 31.0 pg  29.7   MCHC Latest Ref Range: 32.0 - 36.0 g/dL  30.7 (L)   RDW Latest Ref Range: 11.5 - 14.5 %  14.2   Platelets Latest Ref Range: 150 - 350 K/uL  267   MPV Latest Ref Range: 9.2 - 12.9 fL  11.4   Gran% Latest Ref Range: 38.0 - 73.0 %  46.3   Gran # (ANC) Latest Ref Range: 1.8 - 7.7 K/uL  2.5   Lymph% Latest Ref Range: 18.0 - 48.0 %  34.0   Lymph # Latest Ref Range: 1.0 - 4.8 K/uL  1.8   Mono% Latest Ref Range: 4.0 - 15.0 %  7.3   Mono # Latest Ref Range: 0.3 - 1.0 K/uL  0.4   Eosinophil% Latest Ref Range: 0.0 - 8.0 %  11.8 (H)   Eos # Latest Ref Range: 0.0 - 0.5 K/uL  0.6 (H)   Basophil% Latest Ref Range: 0.0 - 1.9 %  0.4   Baso # Latest Ref Range: 0.00 - 0.20 K/uL  0.02   nRBC Latest Ref Range: 0 /100 WBC  0   Differential Method Unknown  Automated   Immature Grans (Abs) Latest Ref Range: 0.00 - 0.04 K/uL  0.01   Immature Granulocytes Latest Ref Range: 0.0 - 0.5 %  0.2   Sodium Latest Ref Range: 136 - 145 mmol/L  142   Potassium Latest Ref Range: 3.5 - 5.1 mmol/L  4.2   Chloride Latest Ref Range: 95 - 110 mmol/L  111 (H)   CO2 Latest Ref Range: 23 - 29 mmol/L  22 (L)   Anion Gap Latest Ref Range: 8 - 16 mmol/L  9   BUN, Bld Latest Ref Range: 6 - 20 mg/dL  14    Creatinine Latest Ref Range: 0.5 - 1.4 mg/dL  0.8   eGFR if non African American Latest Ref Range: >60 mL/min/1.73 m^2  >60.0   eGFR if African American Latest Ref Range: >60 mL/min/1.73 m^2  >60.0   Glucose Latest Ref Range: 70 - 110 mg/dL  93   Calcium Latest Ref Range: 8.7 - 10.5 mg/dL  8.7   Alkaline Phosphatase Latest Ref Range: 55 - 135 U/L  40 (L)   PROTEIN TOTAL Latest Ref Range: 6.0 - 8.4 g/dL  7.2   Albumin Latest Ref Range: 3.5 - 5.2 g/dL  3.9   BILIRUBIN TOTAL Latest Ref Range: 0.1 - 1.0 mg/dL  0.5   AST Latest Ref Range: 10 - 40 U/L  14   ALT Latest Ref Range: 10 - 44 U/L  6 (L)   Triglycerides Latest Ref Range: 30 - 150 mg/dL  93   Cholesterol Latest Ref Range: 120 - 199 mg/dL  204 (H)   HDL Latest Ref Range: 40 - 75 mg/dL  49   Hdl/Cholesterol Ratio Latest Ref Range: 20.0 - 50.0 %  24.0   LDL Cholesterol External Latest Ref Range: 63.0 - 159.0 mg/dL  136.4   Non-HDL Cholesterol Latest Units: mg/dL  155   Total Cholesterol/HDL Ratio Latest Ref Range: 2.0 - 5.0   4.2   Vit D, 25-Hydroxy Latest Ref Range: 30 - 96 ng/mL  16 (L)   Hemoglobin A1C External Latest Ref Range: 4.0 - 5.6 %  5.3   Estimated Avg Glucose Latest Ref Range: 68 - 131 mg/dL  105   TSH Latest Ref Range: 0.400 - 4.000 uIU/mL  1.018   Free T4 Latest Ref Range: 0.71 - 1.51 ng/dL  1.07   POC Molecular Influenza A Ag Latest Ref Range: Negative, Not Reported  Positive (A)    POC Molecular Influenza B Ag Latest Ref Range: Negative, Not Reported  Negative     Acceptable Unknown Yes    POCT INFLUENZA A/B MOLECULAR Unknown Rpt (A)        Assessment:     1. Nodular thyroid disease  US Soft Tissue Head Neck Thyroid    T3    Thyroglobulin    Iodine, Serum    Chromogranin A    Calcitonin   2. Thyroiditis  Thyroglobulin   3. Thyroid disease  Uric acid   4. Goiter     5. Abnormal thyroid blood test     6. Hypovitaminosis D     7. Chronic migraine          Regarding thyroid nodular disease; to obtain screening thyroid USS and relevant  screening labs as detailed above.  Regarding chronic migraines; these dont appear to be sever enough at the moment to warrant long term prophylactic treatment but I did discuss with the patient regarding the potential utility for prophylactic medications as opposed to acute flare treatment options and she will think about that as inform us if the need for this arises in the future.  Regarding hypovitaminosis D; to check 25 OH Vit D levels and replete as needed.    Plan:     FFup in ~ 1 yr.

## 2020-09-12 LAB
T3 SERPL-MCNC: 94 NG/DL (ref 60–180)
URATE SERPL-MCNC: 4.1 MG/DL (ref 2.4–5.7)

## 2020-09-14 LAB
CALCIT SERPL-MCNC: <5 PG/ML
THRYOGLOBULIN INTERPRETATION: ABNORMAL
THYROGLOB AB SERPL-ACNC: <1.8 IU/ML
THYROGLOB SERPL-MCNC: 54 NG/ML

## 2020-09-15 LAB — IODINE SERPL-MCNC: 58 NG/ML (ref 40–92)

## 2020-09-18 LAB — CGA SERPL-MCNC: 24 NG/ML (ref 0–103)

## 2020-11-06 ENCOUNTER — PATIENT OUTREACH (OUTPATIENT)
Dept: ADMINISTRATIVE | Facility: OTHER | Age: 40
End: 2020-11-06

## 2021-01-02 ENCOUNTER — HOSPITAL ENCOUNTER (OUTPATIENT)
Dept: RADIOLOGY | Facility: HOSPITAL | Age: 41
Discharge: HOME | End: 2021-01-02
Attending: INTERNAL MEDICINE
Payer: COMMERCIAL

## 2021-01-02 DIAGNOSIS — E04.1 NODULAR THYROID DISEASE: ICD-10-CM

## 2021-01-02 PROCEDURE — 76536 US SOFT TISSUE HEAD NECK THYROID: ICD-10-PCS | Mod: 26,,, | Performed by: RADIOLOGY

## 2021-01-02 PROCEDURE — 76536 US EXAM OF HEAD AND NECK: CPT | Mod: 26,,, | Performed by: RADIOLOGY

## 2021-01-02 PROCEDURE — 76536 US EXAM OF HEAD AND NECK: CPT | Mod: TC

## 2021-01-04 DIAGNOSIS — E04.1 NODULAR THYROID DISEASE: Primary | ICD-10-CM

## 2021-01-28 ENCOUNTER — HOSPITAL ENCOUNTER (OUTPATIENT)
Dept: RADIOLOGY | Facility: HOSPITAL | Age: 41
Discharge: HOME | End: 2021-01-28
Attending: INTERNAL MEDICINE
Payer: COMMERCIAL

## 2021-01-28 DIAGNOSIS — E04.1 NODULAR THYROID DISEASE: ICD-10-CM

## 2021-01-28 PROCEDURE — 10005 FNA BX W/US GDN 1ST LES: CPT

## 2021-01-28 PROCEDURE — 10005 US FINE NEEDLE ASPIRATION THYROID, FIRST LESION: ICD-10-PCS | Mod: ,,, | Performed by: RADIOLOGY

## 2021-01-28 PROCEDURE — 88173 CYTOPATH EVAL FNA REPORT: CPT | Mod: 26,,, | Performed by: PATHOLOGY

## 2021-01-28 PROCEDURE — 88173 PR  INTERPRETATION OF FNA SMEAR: ICD-10-PCS | Mod: 26,,, | Performed by: PATHOLOGY

## 2021-01-28 PROCEDURE — 88173 CYTOPATH EVAL FNA REPORT: CPT | Performed by: PATHOLOGY

## 2021-01-28 PROCEDURE — 10005 FNA BX W/US GDN 1ST LES: CPT | Mod: ,,, | Performed by: RADIOLOGY

## 2021-02-01 LAB — FINAL PATHOLOGIC DIAGNOSIS: NORMAL

## 2021-04-22 ENCOUNTER — PATIENT MESSAGE (OUTPATIENT)
Dept: UROLOGY | Facility: CLINIC | Age: 41
End: 2021-04-22

## 2021-06-19 ENCOUNTER — OFFICE VISIT (OUTPATIENT)
Dept: URGENT CARE | Facility: CLINIC | Age: 41
End: 2021-06-19
Payer: COMMERCIAL

## 2021-06-19 VITALS
TEMPERATURE: 98 F | DIASTOLIC BLOOD PRESSURE: 82 MMHG | OXYGEN SATURATION: 97 % | RESPIRATION RATE: 16 BRPM | HEIGHT: 65 IN | BODY MASS INDEX: 25.99 KG/M2 | HEART RATE: 80 BPM | WEIGHT: 156 LBS | SYSTOLIC BLOOD PRESSURE: 113 MMHG

## 2021-06-19 DIAGNOSIS — U07.1 COVID-19 VIRUS INFECTION: Primary | ICD-10-CM

## 2021-06-19 DIAGNOSIS — R51.9 SINUS HEADACHE: ICD-10-CM

## 2021-06-19 DIAGNOSIS — U07.1 COVID-19 VIRUS DETECTED: ICD-10-CM

## 2021-06-19 DIAGNOSIS — Z20.822 ENCOUNTER FOR LABORATORY TESTING FOR COVID-19 VIRUS: ICD-10-CM

## 2021-06-19 LAB
CTP QC/QA: YES
CTP QC/QA: YES
POC MOLECULAR INFLUENZA A AGN: NEGATIVE
POC MOLECULAR INFLUENZA B AGN: NEGATIVE
SARS-COV-2 RDRP RESP QL NAA+PROBE: POSITIVE

## 2021-06-19 PROCEDURE — U0002: ICD-10-PCS | Mod: QW,CR,S$GLB, | Performed by: PHYSICIAN ASSISTANT

## 2021-06-19 PROCEDURE — 99214 PR OFFICE/OUTPT VISIT, EST, LEVL IV, 30-39 MIN: ICD-10-PCS | Mod: S$GLB,CS,, | Performed by: PHYSICIAN ASSISTANT

## 2021-06-19 PROCEDURE — U0003 INFECTIOUS AGENT DETECTION BY NUCLEIC ACID (DNA OR RNA); SEVERE ACUTE RESPIRATORY SYNDROME CORONAVIRUS 2 (SARS-COV-2) (CORONAVIRUS DISEASE [COVID-19]), AMPLIFIED PROBE TECHNIQUE, MAKING USE OF HIGH THROUGHPUT TECHNOLOGIES AS DESCRIBED BY CMS-2020-01-R: HCPCS | Performed by: PHYSICIAN ASSISTANT

## 2021-06-19 PROCEDURE — 87502 POCT INFLUENZA A/B MOLECULAR: ICD-10-PCS | Mod: QW,S$GLB,, | Performed by: PHYSICIAN ASSISTANT

## 2021-06-19 PROCEDURE — U0005 INFEC AGEN DETEC AMPLI PROBE: HCPCS | Performed by: PHYSICIAN ASSISTANT

## 2021-06-19 PROCEDURE — U0002 COVID-19 LAB TEST NON-CDC: HCPCS | Mod: QW,CR,S$GLB, | Performed by: PHYSICIAN ASSISTANT

## 2021-06-19 PROCEDURE — 1125F PR PAIN SEVERITY QUANTIFIED, PAIN PRESENT: ICD-10-PCS | Mod: S$GLB,,, | Performed by: PHYSICIAN ASSISTANT

## 2021-06-19 PROCEDURE — 1125F AMNT PAIN NOTED PAIN PRSNT: CPT | Mod: S$GLB,,, | Performed by: PHYSICIAN ASSISTANT

## 2021-06-19 PROCEDURE — 3008F BODY MASS INDEX DOCD: CPT | Mod: CPTII,S$GLB,, | Performed by: PHYSICIAN ASSISTANT

## 2021-06-19 PROCEDURE — 99214 OFFICE O/P EST MOD 30 MIN: CPT | Mod: S$GLB,CS,, | Performed by: PHYSICIAN ASSISTANT

## 2021-06-19 PROCEDURE — 3008F PR BODY MASS INDEX (BMI) DOCUMENTED: ICD-10-PCS | Mod: CPTII,S$GLB,, | Performed by: PHYSICIAN ASSISTANT

## 2021-06-19 PROCEDURE — 87502 INFLUENZA DNA AMP PROBE: CPT | Mod: QW,S$GLB,, | Performed by: PHYSICIAN ASSISTANT

## 2021-06-19 RX ORDER — FLUTICASONE PROPIONATE 50 MCG
1 SPRAY, SUSPENSION (ML) NASAL DAILY
Qty: 18.2 ML | Refills: 0 | Status: SHIPPED | OUTPATIENT
Start: 2021-06-19

## 2021-06-21 LAB — SARS-COV-2 RNA RESP QL NAA+PROBE: DETECTED

## 2021-07-12 ENCOUNTER — OFFICE VISIT (OUTPATIENT)
Dept: ORTHOPEDICS | Facility: CLINIC | Age: 41
End: 2021-07-12
Payer: COMMERCIAL

## 2021-07-12 ENCOUNTER — HOSPITAL ENCOUNTER (OUTPATIENT)
Dept: RADIOLOGY | Facility: HOSPITAL | Age: 41
Discharge: HOME | End: 2021-07-12
Attending: ORTHOPAEDIC SURGERY
Payer: COMMERCIAL

## 2021-07-12 ENCOUNTER — PATIENT MESSAGE (OUTPATIENT)
Dept: ORTHOPEDICS | Facility: CLINIC | Age: 41
End: 2021-07-12

## 2021-07-12 VITALS — HEIGHT: 65 IN | BODY MASS INDEX: 26.5 KG/M2 | WEIGHT: 159.06 LBS

## 2021-07-12 DIAGNOSIS — M20.11 HALLUX VALGUS, RIGHT: Primary | ICD-10-CM

## 2021-07-12 DIAGNOSIS — M79.671 FOOT PAIN, RIGHT: Primary | ICD-10-CM

## 2021-07-12 DIAGNOSIS — M79.671 FOOT PAIN, RIGHT: ICD-10-CM

## 2021-07-12 PROCEDURE — 99999 PR PBB SHADOW E&M-EST. PATIENT-LVL II: ICD-10-PCS | Mod: PBBFAC,,, | Performed by: ORTHOPAEDIC SURGERY

## 2021-07-12 PROCEDURE — 99999 PR PBB SHADOW E&M-EST. PATIENT-LVL II: CPT | Mod: PBBFAC,,, | Performed by: ORTHOPAEDIC SURGERY

## 2021-07-12 PROCEDURE — 73630 X-RAY EXAM OF FOOT: CPT | Mod: 26,RT,, | Performed by: RADIOLOGY

## 2021-07-12 PROCEDURE — 1126F PR PAIN SEVERITY QUANTIFIED, NO PAIN PRESENT: ICD-10-PCS | Mod: S$GLB,,, | Performed by: ORTHOPAEDIC SURGERY

## 2021-07-12 PROCEDURE — 73630 X-RAY EXAM OF FOOT: CPT | Mod: TC,RT

## 2021-07-12 PROCEDURE — 1126F AMNT PAIN NOTED NONE PRSNT: CPT | Mod: S$GLB,,, | Performed by: ORTHOPAEDIC SURGERY

## 2021-07-12 PROCEDURE — 99203 OFFICE O/P NEW LOW 30 MIN: CPT | Mod: S$GLB,,, | Performed by: ORTHOPAEDIC SURGERY

## 2021-07-12 PROCEDURE — 73630 XR FOOT COMPLETE 3 VIEW RIGHT: ICD-10-PCS | Mod: 26,RT,, | Performed by: RADIOLOGY

## 2021-07-12 PROCEDURE — 3008F PR BODY MASS INDEX (BMI) DOCUMENTED: ICD-10-PCS | Mod: CPTII,S$GLB,, | Performed by: ORTHOPAEDIC SURGERY

## 2021-07-12 PROCEDURE — 99203 PR OFFICE/OUTPT VISIT, NEW, LEVL III, 30-44 MIN: ICD-10-PCS | Mod: S$GLB,,, | Performed by: ORTHOPAEDIC SURGERY

## 2021-07-12 PROCEDURE — 3008F BODY MASS INDEX DOCD: CPT | Mod: CPTII,S$GLB,, | Performed by: ORTHOPAEDIC SURGERY

## 2021-07-23 RX ORDER — SUMATRIPTAN 50 MG/1
TABLET, FILM COATED ORAL
Qty: 27 TABLET | Refills: 0 | Status: SHIPPED | OUTPATIENT
Start: 2021-07-23

## 2021-08-24 ENCOUNTER — PATIENT MESSAGE (OUTPATIENT)
Dept: ADMINISTRATIVE | Facility: OTHER | Age: 41
End: 2021-08-24

## 2021-09-02 ENCOUNTER — TELEPHONE (OUTPATIENT)
Dept: OBSTETRICS AND GYNECOLOGY | Facility: CLINIC | Age: 41
End: 2021-09-02

## 2021-09-02 ENCOUNTER — PATIENT MESSAGE (OUTPATIENT)
Dept: OBSTETRICS AND GYNECOLOGY | Facility: CLINIC | Age: 41
End: 2021-09-02

## 2021-09-02 DIAGNOSIS — N30.00 ACUTE CYSTITIS WITHOUT HEMATURIA: Primary | ICD-10-CM

## 2021-09-02 RX ORDER — PHENAZOPYRIDINE HYDROCHLORIDE 200 MG/1
200 TABLET, FILM COATED ORAL 3 TIMES DAILY PRN
Qty: 20 TABLET | Refills: 0 | OUTPATIENT
Start: 2021-09-02 | End: 2021-11-26

## 2021-09-02 RX ORDER — NITROFURANTOIN 25; 75 MG/1; MG/1
100 CAPSULE ORAL 2 TIMES DAILY
Qty: 14 CAPSULE | Refills: 0 | Status: SHIPPED | OUTPATIENT
Start: 2021-09-02 | End: 2021-09-09

## 2021-09-13 ENCOUNTER — TELEPHONE (OUTPATIENT)
Dept: FAMILY MEDICINE | Facility: CLINIC | Age: 41
End: 2021-09-13

## 2021-11-25 ENCOUNTER — PATIENT MESSAGE (OUTPATIENT)
Dept: OBSTETRICS AND GYNECOLOGY | Facility: CLINIC | Age: 41
End: 2021-11-25
Payer: COMMERCIAL

## 2021-11-26 ENCOUNTER — HOSPITAL ENCOUNTER (EMERGENCY)
Facility: HOSPITAL | Age: 41
Discharge: HOME | End: 2021-11-26
Attending: EMERGENCY MEDICINE
Payer: COMMERCIAL

## 2021-11-26 VITALS
OXYGEN SATURATION: 100 % | TEMPERATURE: 98 F | HEART RATE: 59 BPM | DIASTOLIC BLOOD PRESSURE: 70 MMHG | HEIGHT: 65 IN | WEIGHT: 160 LBS | SYSTOLIC BLOOD PRESSURE: 114 MMHG | BODY MASS INDEX: 26.66 KG/M2 | RESPIRATION RATE: 17 BRPM

## 2021-11-26 DIAGNOSIS — N30.00 ACUTE CYSTITIS WITHOUT HEMATURIA: Primary | ICD-10-CM

## 2021-11-26 LAB
B-HCG UR QL: NEGATIVE
BILIRUBIN, POC UA: NEGATIVE
BLOOD, POC UA: ABNORMAL
CLARITY, POC UA: ABNORMAL
COLOR, POC UA: YELLOW
CTP QC/QA: YES
GLUCOSE, POC UA: NEGATIVE
KETONES, POC UA: NEGATIVE
LEUKOCYTE EST, POC UA: ABNORMAL
NITRITE, POC UA: POSITIVE
PH UR STRIP: 7 [PH]
PROTEIN, POC UA: ABNORMAL
SPECIFIC GRAVITY, POC UA: 1.02
UROBILINOGEN, POC UA: 0.2 E.U./DL

## 2021-11-26 PROCEDURE — 25000003 PHARM REV CODE 250: Mod: ER | Performed by: PHYSICIAN ASSISTANT

## 2021-11-26 PROCEDURE — 99284 EMERGENCY DEPT VISIT MOD MDM: CPT | Mod: 25,ER

## 2021-11-26 PROCEDURE — 81003 URINALYSIS AUTO W/O SCOPE: CPT | Mod: ER

## 2021-11-26 PROCEDURE — 81025 URINE PREGNANCY TEST: CPT | Mod: ER | Performed by: NURSE ANESTHETIST, CERTIFIED REGISTERED

## 2021-11-26 RX ORDER — CEPHALEXIN 500 MG/1
500 CAPSULE ORAL
Status: COMPLETED | OUTPATIENT
Start: 2021-11-26 | End: 2021-11-26

## 2021-11-26 RX ORDER — ONDANSETRON 4 MG/1
4 TABLET, ORALLY DISINTEGRATING ORAL EVERY 6 HOURS PRN
Qty: 15 TABLET | Refills: 0 | Status: SHIPPED | OUTPATIENT
Start: 2021-11-26 | End: 2021-12-01

## 2021-11-26 RX ORDER — PHENAZOPYRIDINE HYDROCHLORIDE 200 MG/1
200 TABLET, FILM COATED ORAL
Qty: 6 TABLET | Refills: 0 | Status: SHIPPED | OUTPATIENT
Start: 2021-11-26 | End: 2021-11-28

## 2021-11-26 RX ORDER — CEPHALEXIN 500 MG/1
500 CAPSULE ORAL EVERY 12 HOURS
Qty: 20 CAPSULE | Refills: 0 | Status: SHIPPED | OUTPATIENT
Start: 2021-11-26 | End: 2021-12-06

## 2021-11-26 RX ADMIN — CEPHALEXIN 500 MG: 500 CAPSULE ORAL at 04:11

## 2022-02-28 ENCOUNTER — PATIENT MESSAGE (OUTPATIENT)
Dept: ORTHOPEDICS | Facility: CLINIC | Age: 42
End: 2022-02-28
Payer: COMMERCIAL

## 2022-03-16 DIAGNOSIS — Z11.59 NEED FOR HEPATITIS C SCREENING TEST: ICD-10-CM

## 2022-05-23 ENCOUNTER — TELEPHONE (OUTPATIENT)
Dept: FAMILY MEDICINE | Facility: CLINIC | Age: 42
End: 2022-05-23
Payer: COMMERCIAL

## 2022-05-24 ENCOUNTER — TELEPHONE (OUTPATIENT)
Dept: FAMILY MEDICINE | Facility: CLINIC | Age: 42
End: 2022-05-24
Payer: COMMERCIAL

## 2022-05-31 ENCOUNTER — PATIENT MESSAGE (OUTPATIENT)
Dept: ADMINISTRATIVE | Facility: HOSPITAL | Age: 42
End: 2022-05-31
Payer: COMMERCIAL

## 2022-08-29 ENCOUNTER — PATIENT MESSAGE (OUTPATIENT)
Dept: OBSTETRICS AND GYNECOLOGY | Facility: CLINIC | Age: 42
End: 2022-08-29
Payer: COMMERCIAL

## 2022-08-30 ENCOUNTER — PATIENT MESSAGE (OUTPATIENT)
Dept: OBSTETRICS AND GYNECOLOGY | Facility: CLINIC | Age: 42
End: 2022-08-30
Payer: COMMERCIAL

## 2022-08-30 RX ORDER — NITROFURANTOIN 25; 75 MG/1; MG/1
100 CAPSULE ORAL 2 TIMES DAILY
Qty: 10 CAPSULE | Refills: 0 | Status: SHIPPED | OUTPATIENT
Start: 2022-08-30 | End: 2022-09-04

## 2023-05-31 ENCOUNTER — PATIENT OUTREACH (OUTPATIENT)
Dept: ADMINISTRATIVE | Facility: HOSPITAL | Age: 43
End: 2023-05-31
Payer: COMMERCIAL